# Patient Record
Sex: MALE | Race: WHITE
[De-identification: names, ages, dates, MRNs, and addresses within clinical notes are randomized per-mention and may not be internally consistent; named-entity substitution may affect disease eponyms.]

---

## 2020-12-24 ENCOUNTER — HOSPITAL ENCOUNTER (INPATIENT)
Dept: HOSPITAL 61 - ER | Age: 85
LOS: 5 days | DRG: 177 | End: 2020-12-29
Attending: STUDENT IN AN ORGANIZED HEALTH CARE EDUCATION/TRAINING PROGRAM | Admitting: STUDENT IN AN ORGANIZED HEALTH CARE EDUCATION/TRAINING PROGRAM
Payer: MEDICARE

## 2020-12-24 VITALS — BODY MASS INDEX: 45.1 KG/M2 | HEIGHT: 70 IN | WEIGHT: 315 LBS

## 2020-12-24 VITALS — DIASTOLIC BLOOD PRESSURE: 77 MMHG | SYSTOLIC BLOOD PRESSURE: 173 MMHG

## 2020-12-24 VITALS — SYSTOLIC BLOOD PRESSURE: 178 MMHG | DIASTOLIC BLOOD PRESSURE: 86 MMHG

## 2020-12-24 VITALS — DIASTOLIC BLOOD PRESSURE: 72 MMHG | SYSTOLIC BLOOD PRESSURE: 151 MMHG

## 2020-12-24 VITALS — DIASTOLIC BLOOD PRESSURE: 76 MMHG | SYSTOLIC BLOOD PRESSURE: 204 MMHG

## 2020-12-24 VITALS — SYSTOLIC BLOOD PRESSURE: 166 MMHG | DIASTOLIC BLOOD PRESSURE: 74 MMHG

## 2020-12-24 DIAGNOSIS — J12.89: ICD-10-CM
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
## 2020-12-24 DIAGNOSIS — Z87.891: ICD-10-CM

## 2020-12-24 DIAGNOSIS — E87.1: ICD-10-CM

## 2020-12-24 DIAGNOSIS — U07.1: Primary | ICD-10-CM

## 2020-12-24 DIAGNOSIS — I25.10: ICD-10-CM

## 2020-12-24 DIAGNOSIS — K21.9: ICD-10-CM

## 2020-12-24 DIAGNOSIS — E66.01: ICD-10-CM

## 2020-12-24 DIAGNOSIS — Z93.1: ICD-10-CM

## 2020-12-24 DIAGNOSIS — I50.9: ICD-10-CM

## 2020-12-24 DIAGNOSIS — J96.01: ICD-10-CM

## 2020-12-24 DIAGNOSIS — I71.2: ICD-10-CM

## 2020-12-24 DIAGNOSIS — E87.6: ICD-10-CM

## 2020-12-24 DIAGNOSIS — E78.5: ICD-10-CM

## 2020-12-24 DIAGNOSIS — G70.00: ICD-10-CM
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
## 2020-12-24 DIAGNOSIS — I11.0: ICD-10-CM
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
## 2020-12-24 DIAGNOSIS — H54.61: ICD-10-CM
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
## 2020-12-24 DIAGNOSIS — Z66: ICD-10-CM

## 2020-12-24 DIAGNOSIS — M19.90: ICD-10-CM

## 2020-12-24 DIAGNOSIS — J44.0: ICD-10-CM

## 2020-12-24 DIAGNOSIS — F03.90: ICD-10-CM

## 2020-12-24 DIAGNOSIS — G93.40: ICD-10-CM

## 2020-12-24 LAB
% BANDS: 11 % (ref 0–9)
% LYMPHS: 4 % (ref 24–48)
% MONOS: 4 % (ref 0–10)
% SEGS: 81 % (ref 35–66)
ALBUMIN SERPL-MCNC: 3.4 G/DL (ref 3.4–5)
ALBUMIN/GLOB SERPL: 0.8 {RATIO} (ref 1–1.7)
ALP SERPL-CCNC: 110 U/L (ref 46–116)
ALT SERPL-CCNC: 26 U/L (ref 16–63)
ANION GAP SERPL CALC-SCNC: 12 MMOL/L (ref 6–14)
APTT BLD: 39 SEC (ref 24–38)
AST SERPL-CCNC: 29 U/L (ref 15–37)
BASE EXCESS ABG: 2 MMOL/L (ref -3–3)
BASOPHILS # BLD AUTO: 0 X10^3/UL (ref 0–0.2)
BASOPHILS NFR BLD: 0 % (ref 0–3)
BILIRUB SERPL-MCNC: 0.5 MG/DL (ref 0.2–1)
BUN SERPL-MCNC: 15 MG/DL (ref 8–26)
BUN/CREAT SERPL: 14 (ref 6–20)
CALCIUM SERPL-MCNC: 8.5 MG/DL (ref 8.5–10.1)
CHLORIDE SERPL-SCNC: 91 MMOL/L (ref 98–107)
CK SERPL-CCNC: 266 U/L (ref 39–308)
CO2 SERPL-SCNC: 28 MMOL/L (ref 21–32)
CREAT SERPL-MCNC: 1.1 MG/DL (ref 0.7–1.3)
EOSINOPHIL NFR BLD: 0 % (ref 0–3)
EOSINOPHIL NFR BLD: 0 X10^3/UL (ref 0–0.7)
ERYTHROCYTE [DISTWIDTH] IN BLOOD BY AUTOMATED COUNT: 16.5 % (ref 11.5–14.5)
GFR SERPLBLD BASED ON 1.73 SQ M-ARVRAT: 63.2 ML/MIN
GLUCOSE SERPL-MCNC: 111 MG/DL (ref 70–99)
HCO3 BLDA-SCNC: 25 MMOL/L (ref 21–28)
HCT VFR BLD CALC: 42.4 % (ref 39–53)
HGB BLD-MCNC: 14.5 G/DL (ref 13–17.5)
INFLUENZA A PATIENT: NEGATIVE
INFLUENZA B PATIENT: NEGATIVE
INSPIRATION SETTING TIME VENT: 36
LYMPHOCYTES # BLD: 0.3 X10^3/UL (ref 1–4.8)
LYMPHOCYTES NFR BLD AUTO: 5 % (ref 24–48)
MCH RBC QN AUTO: 30 PG (ref 25–35)
MCHC RBC AUTO-ENTMCNC: 34 G/DL (ref 31–37)
MCV RBC AUTO: 89 FL (ref 79–100)
MONO #: 0.7 X10^3/UL (ref 0–1.1)
MONOCYTES NFR BLD: 11 % (ref 0–9)
NEUT #: 5.5 X10^3/UL (ref 1.8–7.7)
NEUTROPHILS NFR BLD AUTO: 84 % (ref 31–73)
PCO2 BLDA: 37 MMHG (ref 35–46)
PLATELET # BLD AUTO: 167 X10^3/UL (ref 140–400)
PLATELET # BLD EST: ADEQUATE 10*3/UL
PO2 BLDA: 103 MMHG (ref 65–108)
POTASSIUM SERPL-SCNC: 3.4 MMOL/L (ref 3.5–5.1)
PROT SERPL-MCNC: 7.5 G/DL (ref 6.4–8.2)
PROTHROMBIN TIME: 14.2 SEC (ref 11.7–14)
RBC # BLD AUTO: 4.78 X10^6/UL (ref 4.3–5.7)
SAO2 % BLDA: 98 % (ref 92–99)
SODIUM SERPL-SCNC: 131 MMOL/L (ref 136–145)
WBC # BLD AUTO: 6.6 X10^3/UL (ref 4–11)

## 2020-12-24 PROCEDURE — 87040 BLOOD CULTURE FOR BACTERIA: CPT

## 2020-12-24 PROCEDURE — 71275 CT ANGIOGRAPHY CHEST: CPT

## 2020-12-24 PROCEDURE — 82550 ASSAY OF CK (CPK): CPT

## 2020-12-24 PROCEDURE — 85007 BL SMEAR W/DIFF WBC COUNT: CPT

## 2020-12-24 PROCEDURE — G0378 HOSPITAL OBSERVATION PER HR: HCPCS

## 2020-12-24 PROCEDURE — 84484 ASSAY OF TROPONIN QUANT: CPT

## 2020-12-24 PROCEDURE — 80053 COMPREHEN METABOLIC PANEL: CPT

## 2020-12-24 PROCEDURE — 70450 CT HEAD/BRAIN W/O DYE: CPT

## 2020-12-24 PROCEDURE — 83735 ASSAY OF MAGNESIUM: CPT

## 2020-12-24 PROCEDURE — 71045 X-RAY EXAM CHEST 1 VIEW: CPT

## 2020-12-24 PROCEDURE — 80048 BASIC METABOLIC PNL TOTAL CA: CPT

## 2020-12-24 PROCEDURE — 90471 IMMUNIZATION ADMIN: CPT

## 2020-12-24 PROCEDURE — 85610 PROTHROMBIN TIME: CPT

## 2020-12-24 PROCEDURE — 96361 HYDRATE IV INFUSION ADD-ON: CPT

## 2020-12-24 PROCEDURE — 96375 TX/PRO/DX INJ NEW DRUG ADDON: CPT

## 2020-12-24 PROCEDURE — 96365 THER/PROPH/DIAG IV INF INIT: CPT

## 2020-12-24 PROCEDURE — 82805 BLOOD GASES W/O2 SATURATION: CPT

## 2020-12-24 PROCEDURE — 36415 COLL VENOUS BLD VENIPUNCTURE: CPT

## 2020-12-24 PROCEDURE — 87804 INFLUENZA ASSAY W/OPTIC: CPT

## 2020-12-24 PROCEDURE — 84100 ASSAY OF PHOSPHORUS: CPT

## 2020-12-24 PROCEDURE — 83880 ASSAY OF NATRIURETIC PEPTIDE: CPT

## 2020-12-24 PROCEDURE — 85730 THROMBOPLASTIN TIME PARTIAL: CPT

## 2020-12-24 PROCEDURE — 90715 TDAP VACCINE 7 YRS/> IM: CPT

## 2020-12-24 PROCEDURE — 85025 COMPLETE CBC W/AUTO DIFF WBC: CPT

## 2020-12-24 PROCEDURE — 85379 FIBRIN DEGRADATION QUANT: CPT

## 2020-12-24 PROCEDURE — 83605 ASSAY OF LACTIC ACID: CPT

## 2020-12-24 PROCEDURE — 93005 ELECTROCARDIOGRAM TRACING: CPT

## 2020-12-24 PROCEDURE — 36600 WITHDRAWAL OF ARTERIAL BLOOD: CPT

## 2020-12-24 PROCEDURE — U0003 INFECTIOUS AGENT DETECTION BY NUCLEIC ACID (DNA OR RNA); SEVERE ACUTE RESPIRATORY SYNDROME CORONAVIRUS 2 (SARS-COV-2) (CORONAVIRUS DISEASE [COVID-19]), AMPLIFIED PROBE TECHNIQUE, MAKING USE OF HIGH THROUGHPUT TECHNOLOGIES AS DESCRIBED BY CMS-2020-01-R: HCPCS

## 2020-12-24 PROCEDURE — 82962 GLUCOSE BLOOD TEST: CPT

## 2020-12-24 RX ADMIN — ZINC SULFATE CAP 220 MG (50 MG ELEMENTAL ZN) SCH MG: 220 (50 ZN) CAP at 10:31

## 2020-12-24 RX ADMIN — CHOLECALCIFEROL CAP 125 MCG (5000 UNIT) SCH UNIT: 125 CAP at 10:31

## 2020-12-24 RX ADMIN — SIMVASTATIN SCH MG: 20 TABLET, FILM COATED ORAL at 22:32

## 2020-12-24 RX ADMIN — CARVEDILOL SCH MG: 6.25 TABLET, FILM COATED ORAL at 17:52

## 2020-12-24 RX ADMIN — OXYCODONE HYDROCHLORIDE AND ACETAMINOPHEN SCH MG: 500 TABLET ORAL at 10:31

## 2020-12-24 RX ADMIN — ENOXAPARIN SODIUM SCH MG: 40 INJECTION SUBCUTANEOUS at 06:30

## 2020-12-24 RX ADMIN — OXYCODONE HYDROCHLORIDE AND ACETAMINOPHEN SCH MG: 500 TABLET ORAL at 22:32

## 2020-12-24 NOTE — RAD
CT head without contrast dated 12/24/2020.



No comparison available.



CLINICAL INDICATION: Altered mental status.



TECHNIQUE:



Continues axial imaging the head was performed from skull base to vertex. No contrast administered. 

One or more of the following individualized dose reduction techniques were utilized for this examinat
ion:  

1. Automated exposure control  

2. Adjustment of the mA and/or kV according to patient size  

3. Use of iterative reconstruction technique.



FINDINGS:



Ventricles and sulci are moderately prominent for age. No midline shift or mass effect. Moderate patc
hy low density in the deep/subcortical periventricular white matter. No hemorrhage or extra-axial col
lection. Posterior fossa and brainstem unremarkable.



Mild mucosal thickening of the bilateral ethmoid air cells. The visualized paranasal sinuses and mast
oid air cells are otherwise clear. No apparent calvarial abnormality.



IMPRESSION:

1. No evidence of acute intracranial hemorrhage or mass.

2. Moderate chronic small vessel ischemic changes and atrophy.

3. Mild sinus disease.



Electronically signed by: Arnol Frederick MD (12/24/2020 1:29 AM) [facility]

## 2020-12-24 NOTE — PHYS DOC
General Adult


EDM:


Chief Complaint:  ALTERED MENTAL STATUS





HPI:


HPI:


88-year-old male past medical history significant for LE swelling (on lasix 40mg

tid), COPD (former tobacco use), HTN, HLD, gerd, glaucoma, carotid stenosis (no 

cea), R eye blindness (s/p CRAO), OA, and myasthenia gravis (on azathioprine, dx

2016),  presents to the ED brought in by EMS from home, concern for altered 

mental status.  Patient lives with his son and daughter-in-law.  Son tested 

positive for Covid 11 days ago.  Daughter-in-law is having Covid symptoms and 

was tested today, now awaiting results.  Patient was saturating 90% on room air,

required 3 L nasal cannula.  Patient is alert to self, month, location but not 

year (states "[age]"). Pt with no active complaints in ed. Hx limited due

to mental status and hearing difficulties. EMS reports they were out at 

patient's house earlier in the day for an accidental fall in the bathroom, 

unsure if pt hit his head (found back propped up next to tub), w/superficial 

skin tears to left arm. Pt is a DNR code status.





I spoke with pts' daughter, Izabella Travis on phone who provided most of the above 

information. Pts' last known well was yesterday. Pt was saying "I need a 38 and 

a 13," while making the sign of a cross. No known h/o enlarged aorta. Daughter 

reports no h/o intubations, not aware of any enlarged aorta. Last 

hospitalization was 2016 at [facility] when he was diagnosed with MG-why feeding tube 

was placed. Pt has chronic exertional dyspnea.





Review of Systems:


Review of Systems:


Constitutional:   Denies fever or chills, lack of taste/smell


Eyes:   Denies change in visual acuity or red eye


HENT:   Denies nasal congestion or sore throat. [] 


Respiratory:   Denies cough or shortness of breath. [] 


Cardiovascular:   Denies chest pain or edema. [] 


GI:   Deniesnausea, vomiting, or diarrhea. [] 


:  Denies dysuria or hematuria


Musculoskeletal:   Denies back pain or joint pain. [] 


Integument:   Denies rash or crepitus


Neurologic:   Denies headache, neck stiffness, focal weakness or sensory 

changes. [] 


Endocrine:   Denies polyuria or polydipsia. [] 


Psychiatric:  Denies depression or anxiety. []





Heart Score:


Risk Factors:


Risk Factors:  DM, Current or recent (<one month) smoker, HTN, HLP, family 

history of CAD, obesity.


Risk Scores:


Score 0 - 3:  2.5% MACE over next 6 weeks - Discharge Home


Score 4 - 6:  20.3% MACE over next 6 weeks - Admit for Clinical Observation


Score 7 - 10:  72.7% MACE over next 6 weeks - Early Invasive Strategies





Current Medications:





Current Medications








 Medications


  (Trade)  Dose


 Ordered  Sig/Judie  Start Time


 Stop Time Status Last Admin


Dose Admin


 


 Sodium Chloride  1,000 ml @ 


 1,000 mls/hr  1X  ONCE  20 00:15


 20 01:14   














Allergies:


Allergies:





Allergies








Coded Allergies Type Severity Reaction Last Updated Verified


 


  Unable to Assess    20 No











Physical Exam:


PE:





Constitutional: no acute distress, non-toxic appearance, very hard of hearing, 

afebrile


HENT: Normocephalic, atraumatic,


Eyes: EOMI, conjunctiva normal, no discharge.  


Neck: Normal range of motion,  supple, 


Cardiovascular: S1/2 present, regular rhythm


Lungs & Thorax: Speaking in full sentences, bilateral equal chest rise, no 

tachypnea or increased work of breathing, 96% on 3LNC


Abdomen:  soft, no tenderness, obese, G-tube in place


Skin: Warm, dry, bl LE edema with venous stasis


Back: No tenderness, no CVA tenderness. [] 


Extremities: no cyanosis, skin tears over left arm


Neurologic: GCS14, alert to name, month, place not year, normal motor function, 

normal sensory function, no focal deficits noted. []


Psychologic: Affect normal, judgement normal, mood normal





EKG:


EKG:


Sinus rhythm at 97 bpm, left axis deviation, , , first-degree AV 

block, no T wave inversions, no ST elevations or ST depressions, inferior Q 

waves





Radiology/Procedures:


Radiology/Procedures:


                                 IMAGING REPORT





                                     Signed





PATIENT: GRAYBILL,CLAUDE WACCOUNT: [id number]     MRN#: [id number]


: 1932           LOCATION: ER              AGE: 88


SEX: M                    EXAM DT: 20         ACCESSION#: [id number]


STATUS: PRE ER            ORD. PHYSICIAN: BLANK GARCIA DO


REASON: soa


PROCEDURE: PORTABLE CHEST 1V





Single view chest dated 2020.





No comparison available.





CLINICAL INDICATION: Shortness of breath.





FINDINGS:





Single upright portable exam performed. Heart size is moderately enlarged. 

Tortuosity of the thoracic aorta. Lungs are hypoinflated and there is patchy and

 linear opacity at both lung bases. No pleural effusion. No pneumothorax.





IMPRESSION:


1. Patchy bibasilar opacity, atelectasis versus early pneumonia.


2. Cardiomegaly with tortuosity and ectasia of the thoracic aorta.





Electronically signed by: Arnol Frederick MD (2020 12:40 AM) [facility]














DICTATED and SIGNED BY:     ARNOL FREDERICK MD


DATE:     20 [id number] 0


                                        


                                 IMAGING REPORT





                                     Signed





PATIENT: GRAYBILL,CLAUDE WACCOUNT: [id number]     MRN#: [id number]


: 1932           LOCATION: ER              AGE: 88


SEX: M                    EXAM DT: 20         ACCESSION#: [id number]


STATUS: REG ER            ORD. PHYSICIAN: BLANK GARCIA DO


REASON: ams


PROCEDURE: CT HEAD WO CONTRAST





CT head without contrast dated 2020.





No comparison available.





CLINICAL INDICATION: Altered mental status.





TECHNIQUE:





Continues axial imaging the head was performed from skull base to vertex. No 

contrast administered. 


One or more of the following individualized dose reduction techniques were 

utilized for this examination:  


1. Automated exposure control  


2. Adjustment of the mA and/or kV according to patient size  


3. Use of iterative reconstruction technique.





FINDINGS:





Ventricles and sulci are moderately prominent for age. No midline shift or mass 

effect. Moderate patchy low density in the deep/subcortical periventricular 

white matter. No hemorrhage or extra-axial collection. Posterior fossa and 

brainstem unremarkable.





Mild mucosal thickening of the bilateral ethmoid air cells. The visualized 

paranasal sinuses and mastoid air cells are otherwise clear. No apparent 

calvarial abnormality.





IMPRESSION:


1. No evidence of acute intracranial hemorrhage or mass.


2. Moderate chronic small vessel ischemic changes and atrophy.


3. Mild sinus disease.





Electronically signed by: Arnol Frederick MD (2020 1:29 AM) [facility]














DICTATED and SIGNED BY:     ARNOL FREDERICK MD


DATE:     20 [id number] 0


                                 IMAGING REPORT





                                     Signed





PATIENT: GRAYBILL,CLAUDE WACCOUNT: [id number]     MRN#: [id number]


: 1932           LOCATION: ER              AGE: 88


SEX: M                    EXAM DT: 20         ACCESSION#: [id number]


STATUS: REG ER            ORD. PHYSICIAN: BLANK GARCIA DO


REASON: soa, r/o pe


PROCEDURE: CT ANGIOGRAPHY CHEST





CTA chest with contrast dated 2020.





No comparison available.





CLINICAL INDICATION: Shortness of breath. Possible pulmonary embolus.





TECHNIQUE:





Contiguous axial imaging the chest performed following the intravenous 

administration of intravenous Isovue-370. Study was performed as dedicated PE 

protocol with thin cut coronal MIPS 3-D reconstruction. 


One or more of the following individualized dose reduction techniques were 

utilized for this examination:  


1. Automated exposure control  


2. Adjustment of the mA and/or kV according to patient size  


3. Use of iterative reconstruction technique





FINDINGS:





Contrast bolus is adequate. No evidence of central, lobar or segmental pulmonary

 embolus. Subsegmental branches are not well evaluated based on technique.





Heart size is mildly enlarged. No pericardial effusion. Coronary artery 

calcifications. 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 

aorta measuring 4 cm transverse. No mediastinal, hilar or axillary 

lymphadenopathy. Thyroid gland unremarkable.





Central airways are patent. There are irregular linear peripheral opacities 

throughout both lungs, basilar predominant. Intermixed patchy groundglass 

opacity bilaterally. No consolidation. No significant pleural effusion. No 

pneumothorax.





Images of the upper abdomen are unremarkable. There are low-density foci at the 

upper pole of each kidney that are indeterminate but probably represent cysts. 

Percutaneous gastric tube in place. No acute bony abnormality. Multilevel 

spondylosis.





IMPRESSION:


1. No evidence of central, lobar or segmental pulmonary embolus.


2. Prominent interstitial markings with intermixed groundglass opacity, 

nonspecific. This could be related to low-grade edema or interstitial fibrosis. 

Atypical infection considered less likely.


3. Coronary artery calcifications and mild aneurysmal dilation of the ascending 

thoracic aorta.





Electronically signed by: Arnol Frederick MD (2020 2:40 AM) [facility] [place]














DICTATED and SIGNED BY:     ARNOL FREDERICK MD


DATE:     20 [id number] 0





Course & Med Decision Making:


Course & Med Decision Making


Pertinent Labs and Imaging studies reviewed. (See chart for details)





Concern for hypoxia in a male with multiple comorbidities, likely covid, test 

pending. Requiring NC.  Influenza negative.  Patient afebrile with no 

leukocytosis.  Chest x-ray concerning for pneumonia.  CTA showing ectatic 

thoracic aorta with no pulmonary emboli.  Will admit for further medical 

management. Daughter agrees with this plan and aware he likely has covid, 

guarded condition.





I have spoken with the patient and/or caregivers.  I have explained the p

atient's condition, diagnosis and treatment plan based on the information 

available to me at this time.  I have answered the patient's and/or caregivers 

questions and answered any concerns.  The patient and/or caregivers have as good

 an understanding of the patient's diagnosis, condition and treatment plan as 

can be expected at this point.  The patient has been stabilized within the 

capability of the emergency department.  The patient will be transported for 

further care and management or will be moved to an observation or inpatient 

service.  I have communicated with the staff or medical practitioner taking over

 this patient's care.





Dragon Disclaimer:


Dragon Disclaimer:


This electronic medical record was generated, in whole or in part, using a voice

 recognition dictation system.





Departure


Departure


Impression:  


   Primary Impression:  


   AMS (altered mental status)


   Additional Impressions:  


   Person under investigation for COVID-19


   Acute respiratory failure with hypoxia


   CAP (community acquired pneumonia)


Disposition:  09 ADMITTED AS INPT THIS HOSP


Admitting Physician:  ALY (Dr. Eaton)


Condition:  GUARDED


Referrals:  


NO PCP (PCP)











BLANK GARCIA DO               Dec 24, 2020 01:02

## 2020-12-24 NOTE — PDOC1
History and Physical


Date of Service:


DOS:


DATE: 12/24/20 


TIME: 09:00





Chief Complaint:


Chief Complain:


altered mental status





History of Present Illness:


HPI:


88-year-old male past medical history significant for LE swelling on lasix 40mg 

tid, COPD (former tobacco use), HTN, HLD, gerd, glaucoma, R eye blindness (s/p 

CRAO), OA, and myasthenia gravis (on azathioprine, dx 2016),  presents to the ED

brought in by EMS from home, concern for altered mental status.  Patient lives 

with his son and daughter-in-law.  Son tested positive for Covid 11 days ago.  

Daughter-in-law is having Covid symptoms and was tested today, now awaiting 

results.  Patient was saturating 90% on room air, required 3 L nasal cannula.  

Patient is alert to self, month, location but not year. Pt with no active 

complaints in ed. Hx limited due to mental status and hearing difficulties. EMS 

reports they were out at patient's house earlier in the day for an accidental 

fall in the bathroom, unsure if pt hit his head (found back propped up next to 

tub), w/superficial skin tears to left arm. Pt is a DNR code status.





Patient's daughter, Izabella Travis, provided most of the above information. Pts' 

last known well was yesterday. Pt was saying "I need a 38 and a 13," while 

making the sign of a cross. No known h/o enlarged aorta. Daughter reports no h/o

intubations, not aware of any enlarged aorta. [place] 

[facility] [place] [facility] [place]-that's why feeding tube was placed. Pt has 

chronic exertional dyspnea.





Past Medical/Surgical History:


PMH/PSH:


PMHx :myasthenia gravis on azathioprine treatment COPD, hypertension, 

dyslipidemia, GERD, glaucoma, osteoarthritis


Past surgical history: PEG tube placement





Allergies:


Allergies:  


Coded Allergies:  


     No Known Drug Allergies (Unverified , 12/24/20)





Family History:


Family History:


Reviewed with no relevant findings





Social History:


Social History:


Former smoker





Current Medications:


Current Medications





Current Medications


Sodium Chloride 1,000 ml @  1,000 mls/hr 1X  ONCE IV  Last administered on 

12/24/20at 00:55;  Start 12/24/20 at 00:15;  Stop 12/24/20 at 01:14;  Status DC


Sodium Chloride 1,000 ml @  1,000 mls/hr 1X  ONCE IV  Last administered on 

12/24/20at 00:56;  Start 12/24/20 at 00:15;  Stop 12/24/20 at 01:14;  Status DC


Ceftriaxone Sodium (Rocephin) 1 gm 1X  ONCE IVP  Last administered on 12/24/20at

02:05;  Start 12/24/20 at 01:15;  Stop 12/24/20 at 01:16;  Status DC


Azithromycin 250 ml @  250 mls/hr 1X  ONCE IV  Last administered on 12/24/20at 

02:05;  Start 12/24/20 at 01:15;  Stop 12/24/20 at 02:14;  Status DC


Dexamethasone Sodium Phosphate (Decadron) 10 mg 1X  ONCE IV  Last administered 

on 12/24/20at 02:05;  Start 12/24/20 at 01:15;  Stop 12/24/20 at 01:16;  Status 

DC


Iohexol (Omnipaque 350 Mg/ml) 100 ml 1X  ONCE IV  Last administered on 

12/24/20at 02:31;  Start 12/24/20 at 02:15;  Stop 12/24/20 at 02:16;  Status DC


Info (CONTRAST GIVEN -- Rx MONITORING) 1 each PRN DAILY  PRN MC SEE COMMENTS;  

Start 12/24/20 at 02:15;  Stop 12/26/20 at 02:14


Diphtheria/ Tetanus/Acell Pertussis (ADACEL TDap SYRINGE) 0.5 ml ONCE ONCE VAX 

IM  Last administered on 12/24/20at 05:12;  Start 12/24/20 at 03:00;  Stop 

12/24/20 at 03:01;  Status DC


Ondansetron HCl (Zofran) 4 mg PRN Q8HRS  PRN IV NAUSEA/VOMITING;  Start 12/24/20

at 03:15;  Stop 12/25/20 at 03:14


Acetaminophen (Tylenol) 650 mg PRN Q4HRS  PRN PO FEVER > 100.3'F;  Start 

12/24/20 at 03:15;  Stop 12/24/20 at 06:36;  Status DC


Doxycycline Hyclate 100 mg/ Dextrose 100 ml @  50 mls/hr Q12HR IV ;  Start 

12/25/20 at 09:00


Dexamethasone Sodium Phosphate (Decadron) 6 mg DAILY IVP ;  Start 12/25/20 at 

09:00


Ascorbic Acid (Vitamin C) 500 mg BID PO ;  Start 12/24/20 at 09:00;  Stop 1/7/21

at 08:59


Zinc Sulfate (Orazinc) 220 mg DAILY PO ;  Start 12/24/20 at 09:00;  Stop 1/7/21 

at 08:59


Vitamin D (Vitamin D3) 5,000 unit DAILY PO ;  Start 12/24/20 at 09:00;  Stop 

1/7/21 at 08:59


Ondansetron HCl (Zofran) 4 mg PRN Q6HRS  PRN IVP NAUSEA/VOMITING;  Start 

12/24/20 at 06:15


Al Hydroxide/Mg Hydroxide (Mylanta Plus Xs) 30 ml PRN Q3HRS  PRN PO HEARTBURN / 

GAS;  Start 12/24/20 at 06:15


Calcium Carbonate/ Glycine (Tums) 500 mg PRN Q3HRS  PRN PO UPSET STOMACH;  Start

12/24/20 at 06:15


Zolpidem Tartrate (Ambien) 5 mg PRN QHS  PRN PO INSOMNIA, MAY REPEAT IN 1HR;  

Start 12/24/20 at 06:15


Morphine Sulfate (Morphine Sulfate) 2 mg PRN Q1HR  PRN IV PAIN;  Start 12/24/20 

at 06:15


Acetaminophen (Tylenol) 650 mg PRN Q6HRS  PRN PO Headaches, Temp > 101.5F;  

Start 12/24/20 at 06:15


Magnesium Hydroxide (Milk Of Magnesia) 2,400 mg PRN Q12HR  PRN PO CONSTIPATION; 

Start 12/24/20 at 06:15


Bisacodyl (Dulcolax Supp) 10 mg PRN DAILY  PRN CO CONSTIPATION;  Start 12/24/20 

at 06:15


Enoxaparin Sodium (Lovenox 40mg Syringe) 40 mg Q24H SQ ;  Start 12/24/20 at 

06:30


Tramadol HCl (Ultram) 50 mg PRN Q6HRS  PRN PO PAIN;  Start 12/24/20 at 06:15





ROS:


Review of Systems


Review of System


REVIEW OF SYSTEMS:


GENERAL:  Denies weakness


SKIN:  No bruising, hair changes or rashes.


EYES:  No blurred, double or loss of vision.


NOSE AND THROAT:  No history of nosebleeds, hoarseness or sore throat.


HEART:  No history of palpitations, chest pain or shortness of breath on


exertion.


LUNGS:  Denies cough, hemoptysis, wheezing or shortness of breath.


GASTROINTESTINAL:  Denies changes in appetite, nausea, vomiting, diarrhea or


constipation.


GENITOURINARY:  No history of frequency, urgency, hesitancy or nocturia.


NEUROLOGIC:  Denies history of numbness, tingling, or tremor.


PSYCHIATRIC:  No history of panic, anxiety or depression.


ENDOCRINE:  No history of heat or cold intolerance, polyuria or polydipsia.


EXTREMITIES:  Denies joint pain, pain on walking or stiffness.





Physical Exam:


Vital Signs:





Vital Signs








  Date Time  Temp Pulse Resp B/P (MAP) Pulse Ox O2 Delivery O2 Flow Rate FiO2


 


12/24/20 06:36  88 18  94   


 


12/24/20 04:01 99.7       





 99.7       


 


12/24/20 00:15    155/84 (107)  Nasal Cannula 3.0 








Physcial Exam:


GEN:   No apparent distress.  Alert and oriented


HEENT:   Normal cephalic, atraumatic, external auditory canals are patent


EYES:   Extraocular muscles are intact, pupil are equally round and reactive to 

light and accommodation


MUSCULOSKELETAL:  Well developed , well nourished, good range of motion


ENDOCRINE:   No thyromegaly was palpated


LYMPHATICS:   No cervical chain or axillary nodes were noted


HEMATOPOIETIC:  No bruising


NECK:   Supple, no JVD, no thyromegaly was noted


LUNGS:   Clear to auscultation in all lung fields without rhonchi or wheezing


HEART:    RRR, S!, S2 present.  Peripheral pulses intact, no obvious murmurs 

noted


ABDOMEN:   Soft, nontender.  Positive bowel sounds, no organomegaly, normal 

bowel sounds


EXTREMITIES:   Without clubbing, cyanosis, or edema.  Pedal pulses intact.  

Negative Homans sign


NEUROLOGIC:   Normal speech and tone.  A&O x 3, moves all extremities, no 

obvious focal deficits


PSYCHIATRIC:   Normal affect, normal mood. Stable


SKIN:   No ulcerations or rashes, good skin turgor, no jaundice


VASCULAR:   Good capillary refill, neurovascular bundle appears to be intact





Labs:


Labs:





Laboratory Tests








Test


 12/24/20


00:37 12/24/20


00:52 12/24/20


02:00


 


Influenza Type A Antigen


 Negative


(NEGATIVE) 


 





 


Influenza Type B Antigen


 Negative


(NEGATIVE) 


 





 


White Blood Count


 


 6.6 x10^3/uL


(4.0-11.0) 





 


Red Blood Count


 


 4.78 x10^6/uL


(4.30-5.70) 





 


Hemoglobin


 


 14.5 g/dL


(13.0-17.5) 





 


Hematocrit


 


 42.4 %


(39.0-53.0) 





 


Mean Corpuscular Volume  89 fL ()  


 


Mean Corpuscular Hemoglobin  30 pg (25-35)  


 


Mean Corpuscular Hemoglobin


Concent 


 34 g/dL


(31-37) 





 


Red Cell Distribution Width


 


 16.5 %


(11.5-14.5) 





 


Platelet Count


 


 167 x10^3/uL


(140-400) 





 


Neutrophils (%) (Auto)  84 % (31-73)  


 


Lymphocytes (%) (Auto)  5 % (24-48)  


 


Monocytes (%) (Auto)  11 % (0-9)  


 


Eosinophils (%) (Auto)  0 % (0-3)  


 


Basophils (%) (Auto)  0 % (0-3)  


 


Neutrophils # (Auto)


 


 5.5 x10^3/uL


(1.8-7.7) 





 


Lymphocytes # (Auto)


 


 0.3 x10^3/uL


(1.0-4.8) 





 


Monocytes # (Auto)


 


 0.7 x10^3/uL


(0.0-1.1) 





 


Eosinophils # (Auto)


 


 0.0 x10^3/uL


(0.0-0.7) 





 


Basophils # (Auto)


 


 0.0 x10^3/uL


(0.0-0.2) 





 


Segmented Neutrophils %  81 % (35-66)  


 


Band Neutrophils %  11 % (0-9)  


 


Lymphocytes %  4 % (24-48)  


 


Monocytes %  4 % (0-10)  


 


Platelet Estimate


 


 Adequate


(ADEQUATE) 





 


Prothrombin Time


 


 14.2 SEC


(11.7-14.0) 





 


Prothromb Time International


Ratio 


 1.1 (0.8-1.1) 


 





 


Activated Partial


Thromboplast Time 


 39 SEC (24-38) 


 





 


D-Dimer (Ban)


 


 2.73 ug/mlFEU


(0.00-0.50) 





 


Sodium Level


 


 131 mmol/L


(136-145) 





 


Potassium Level


 


 3.4 mmol/L


(3.5-5.1) 





 


Chloride Level


 


 91 mmol/L


() 





 


Carbon Dioxide Level


 


 28 mmol/L


(21-32) 





 


Anion Gap  12 (6-14)  


 


Blood Urea Nitrogen


 


 15 mg/dL


(8-26) 





 


Creatinine


 


 1.1 mg/dL


(0.7-1.3) 





 


Estimated GFR


(Cockcroft-Gault) 


 63.2 


 





 


BUN/Creatinine Ratio  14 (6-20)  


 


Glucose Level


 


 111 mg/dL


(70-99) 





 


Lactic Acid Level


 


 1.2 mmol/L


(0.4-2.0) 





 


Calcium Level


 


 8.5 mg/dL


(8.5-10.1) 





 


Total Bilirubin


 


 0.5 mg/dL


(0.2-1.0) 





 


Aspartate Amino Transf


(AST/SGOT) 


 29 U/L (15-37) 


 





 


Alanine Aminotransferase


(ALT/SGPT) 


 26 U/L (16-63) 


 





 


Alkaline Phosphatase


 


 110 U/L


() 





 


Creatine Kinase


 


 266 U/L


() 





 


Troponin I Quantitative


 


 < 0.017 ng/mL


(0.000-0.055) 





 


NT-Pro-B-Type Natriuretic


Peptide 


 362 pg/mL


(0-449) 





 


Total Protein


 


 7.5 g/dL


(6.4-8.2) 





 


Albumin


 


 3.4 g/dL


(3.4-5.0) 





 


Albumin/Globulin Ratio  0.8 (1.0-1.7)  


 


O2 Saturation   98 % (92-99) 


 


Arterial Blood pH


 


 


 7.45


(7.35-7.45)


 


Arterial Blood pCO2 at


Patient Temp 


 


 37 mmHg


(35-46)


 


Arterial Blood pO2 at Patient


Temp 


 


 103 mmHg


()


 


Arterial Blood HCO3


 


 


 25 mmol/L


(21-28)


 


Arterial Blood Base Excess


 


 


 2 mmol/L


(-3-3)


 


FiO2   36 








Laboratory Tests








Test


 12/24/20


00:37 12/24/20


00:52 12/24/20


02:00


 


Influenza Type A Antigen


 Negative


(NEGATIVE) 


 





 


Influenza Type B Antigen


 Negative


(NEGATIVE) 


 





 


White Blood Count


 


 6.6 x10^3/uL


(4.0-11.0) 





 


Red Blood Count


 


 4.78 x10^6/uL


(4.30-5.70) 





 


Hemoglobin


 


 14.5 g/dL


(13.0-17.5) 





 


Hematocrit


 


 42.4 %


(39.0-53.0) 





 


Mean Corpuscular Volume  89 fL ()  


 


Mean Corpuscular Hemoglobin  30 pg (25-35)  


 


Mean Corpuscular Hemoglobin


Concent 


 34 g/dL


(31-37) 





 


Red Cell Distribution Width


 


 16.5 %


(11.5-14.5) 





 


Platelet Count


 


 167 x10^3/uL


(140-400) 





 


Neutrophils (%) (Auto)  84 % (31-73)  


 


Lymphocytes (%) (Auto)  5 % (24-48)  


 


Monocytes (%) (Auto)  11 % (0-9)  


 


Eosinophils (%) (Auto)  0 % (0-3)  


 


Basophils (%) (Auto)  0 % (0-3)  


 


Neutrophils # (Auto)


 


 5.5 x10^3/uL


(1.8-7.7) 





 


Lymphocytes # (Auto)


 


 0.3 x10^3/uL


(1.0-4.8) 





 


Monocytes # (Auto)


 


 0.7 x10^3/uL


(0.0-1.1) 





 


Eosinophils # (Auto)


 


 0.0 x10^3/uL


(0.0-0.7) 





 


Basophils # (Auto)


 


 0.0 x10^3/uL


(0.0-0.2) 





 


Segmented Neutrophils %  81 % (35-66)  


 


Band Neutrophils %  11 % (0-9)  


 


Lymphocytes %  4 % (24-48)  


 


Monocytes %  4 % (0-10)  


 


Platelet Estimate


 


 Adequate


(ADEQUATE) 





 


Prothrombin Time


 


 14.2 SEC


(11.7-14.0) 





 


Prothromb Time International


Ratio 


 1.1 (0.8-1.1) 


 





 


Activated Partial


Thromboplast Time 


 39 SEC (24-38) 


 





 


D-Dimer (Ban)


 


 2.73 ug/mlFEU


(0.00-0.50) 





 


Sodium Level


 


 131 mmol/L


(136-145) 





 


Potassium Level


 


 3.4 mmol/L


(3.5-5.1) 





 


Chloride Level


 


 91 mmol/L


() 





 


Carbon Dioxide Level


 


 28 mmol/L


(21-32) 





 


Anion Gap  12 (6-14)  


 


Blood Urea Nitrogen


 


 15 mg/dL


(8-26) 





 


Creatinine


 


 1.1 mg/dL


(0.7-1.3) 





 


Estimated GFR


(Cockcroft-Gault) 


 63.2 


 





 


BUN/Creatinine Ratio  14 (6-20)  


 


Glucose Level


 


 111 mg/dL


(70-99) 





 


Lactic Acid Level


 


 1.2 mmol/L


(0.4-2.0) 





 


Calcium Level


 


 8.5 mg/dL


(8.5-10.1) 





 


Total Bilirubin


 


 0.5 mg/dL


(0.2-1.0) 





 


Aspartate Amino Transf


(AST/SGOT) 


 29 U/L (15-37) 


 





 


Alanine Aminotransferase


(ALT/SGPT) 


 26 U/L (16-63) 


 





 


Alkaline Phosphatase


 


 110 U/L


() 





 


Creatine Kinase


 


 266 U/L


() 





 


Troponin I Quantitative


 


 < 0.017 ng/mL


(0.000-0.055) 





 


NT-Pro-B-Type Natriuretic


Peptide 


 362 pg/mL


(0-449) 





 


Total Protein


 


 7.5 g/dL


(6.4-8.2) 





 


Albumin


 


 3.4 g/dL


(3.4-5.0) 





 


Albumin/Globulin Ratio  0.8 (1.0-1.7)  


 


O2 Saturation   98 % (92-99) 


 


Arterial Blood pH


 


 


 7.45


(7.35-7.45)


 


Arterial Blood pCO2 at


Patient Temp 


 


 37 mmHg


(35-46)


 


Arterial Blood pO2 at Patient


Temp 


 


 103 mmHg


()


 


Arterial Blood HCO3


 


 


 25 mmol/L


(21-28)


 


Arterial Blood Base Excess


 


 


 2 mmol/L


(-3-3)


 


FiO2   36 











Images:


Images


CXR





IMPRESSION:


1. Patchy bibasilar opacity, atelectasis versus early pneumonia.


2. Cardiomegaly with tortuosity and ectasia of the thoracic aorta.








HEAD CT





IMPRESSION:


1. No evidence of acute intracranial hemorrhage or mass.


2. Moderate chronic small vessel ischemic changes and atrophy.


3. Mild sinus disease.





CHEST CTA





IMPRESSION:


1. No evidence of central, lobar or segmental pulmonary embolus.


2. Prominent interstitial markings with intermixed groundglass opacity, 

nonspecific. This could be related to low-grade edema or interstitial fibrosis. 

Atypical infection considered less likely.


3. Coronary artery calcifications and mild aneurysmal dilation of the ascending 

thoracic aorta.





Assessment/Plan


Assessment/Plan


Acute  encephalopathy NOS


PUI for COVID


Acute hypoxic respiratory distress


Hyponatremia, Hypokalemia


Myasthenia Gravis








No obvious centrally acting medications currently.  No obvious signs of 

infection on physical exam.  No fevers or nuchal rigidity.  CT head is negative 

for acute etiology.  No history or signs of trauma


Consider dementia prevention protocol


Provide adequate lighting (open curtains during the day, turn the lights off at 

night)


Provide frequent personal contact with family, friends, and staff or TV


Encourage early and frequent mobilization


Rehab screening ordered


IV Haldol as needed for agitation, consider sitter as needed if non-redirectable

agitation


Avoid physical restraints, catheters or tubes, and benzodiazepines


Nutrition consult if there is malnutrition or concern for vitamin deficiencies


Continue IV fluids


Pulmonology consult for Covid PNA


Lovenox for DVT prophylaxis


[] GI prophylaxis


ADA diet


Full code


Discussed with RN and SW


Dispo





Justifications for Admission


Other Justification


Respiratory failure with hypoxia, COVID-19 PNA











KELLIE CANDELARIA MD                    Dec 24, 2020 09:05

## 2020-12-24 NOTE — RAD
CTA chest with contrast dated 12/24/2020.



No comparison available.



CLINICAL INDICATION: Shortness of breath. Possible pulmonary embolus.



TECHNIQUE:



Contiguous axial imaging the chest performed following the intravenous administration of intravenous 
Isovue-370. Study was performed as dedicated PE protocol with thin cut coronal MIPS 3-D reconstructio
n. 

One or more of the following individualized dose reduction techniques were utilized for this examinat
ion:  

1. Automated exposure control  

2. Adjustment of the mA and/or kV according to patient size  

3. Use of iterative reconstruction technique



FINDINGS:



Contrast bolus is adequate. No evidence of central, lobar or segmental pulmonary embolus. Subsegmenta
l branches are not well evaluated based on technique.



Heart size is mildly enlarged. No pericardial effusion. Coronary artery calcifications. There is mild
 aneurysmal dilation of the ascending thoracic aorta measuring 4 cm transverse. No mediastinal, hilar
 or axillary lymphadenopathy. Thyroid gland unremarkable.



Central airways are patent. There are irregular linear peripheral opacities throughout both lungs, ba
silar predominant. Intermixed patchy groundglass opacity bilaterally. No consolidation. No significan
t pleural effusion. No pneumothorax.



Images of the upper abdomen are unremarkable. There are low-density foci at the upper pole of each ki
dney that are indeterminate but probably represent cysts. Percutaneous gastric tube in place. No acut
e bony abnormality. Multilevel spondylosis.



IMPRESSION:

1. No evidence of central, lobar or segmental pulmonary embolus.

2. Prominent interstitial markings with intermixed groundglass opacity, nonspecific. This could be re
lated to low-grade edema or interstitial fibrosis. Atypical infection considered less likely.

3. Coronary artery calcifications and mild aneurysmal dilation of the ascending thoracic aorta.



Electronically signed by: Arnol Frederick MD (12/24/2020 2:40 AM) [facility]

## 2020-12-24 NOTE — RAD
Single view chest dated 12/24/2020.



No comparison available.



CLINICAL INDICATION: Shortness of breath.



FINDINGS:



Single upright portable exam performed. Heart size is moderately enlarged. Tortuosity of the thoracic
 aorta. Lungs are hypoinflated and there is patchy and linear opacity at both lung bases. No pleural 
effusion. No pneumothorax.



IMPRESSION:

1. Patchy bibasilar opacity, atelectasis versus early pneumonia.

2. Cardiomegaly with tortuosity and ectasia of the thoracic aorta.



Electronically signed by: Arnol Frederick MD (12/24/2020 12:40 AM) [facility]

## 2020-12-24 NOTE — CONS
DATE OF CONSULTATION:  



PULMONARY CONSULTATION



ATTENDING PHYSICIAN:  Dr. Adarsh Eaton.



REASON FOR CONSULTATION:  Dyspnea, respiratory failure.



HISTORY OF PRESENT ILLNESS:  The patient is an 88-year-old male with a BMI of

47.  He denies any tobacco history recently, but a former tobacco user.  He was

brought into the hospital with complaint of some altered mental status as well

as dyspnea and hypoxia.  The patient has history of myasthenia gravis for which

he is on azathioprine.  He lives with his son and daughter-in-law.  Son tested

positive for COVID 11 days ago.  Daughter-in-law was also having COVID symptoms

and was tested today, results are not available.  The patient is being currently

on 3 liters nasal cannula.  He does not appear to be in any obvious respiratory

distress.  He does have a cough.  No chest pain, no nausea, vomiting, no

diarrhea, no dysuria.  No focal weakness.



CT of the chest was reviewed by me.  This was done for pulmonary embolism

protocol.  There was no evidence of any major pulmonary emboli.  There is

evidence of prominent interstitial markings with some mixed ground glass

opacities.  There is no old x-ray available for comparison.  I have been asked

to see him for further evaluation.  COVID test is pending.



PAST MEDICAL HISTORY:  Significant for myasthenia gravis, on azathioprine,

questionable COPD, history of obesity, GERD, glaucoma, osteoarthritis.



PAST SURGICAL HISTORY:  History of PEG tube placement.



ALLERGIES:  None.



FAMILY HISTORY:  Noncontributory to lungs.



ALLERGIES:  None.



MEDICATIONS:  Reviewed as listed in the MRAD including dexamethasone,

doxycycline.  Lovenox for DVT prophylaxis.



PHYSICAL EXAMINATION:  On examination, he is in no obvious respiratory distress.

 T-max of 99.7.  Blood pressure 178/86, pulse ox 99%.  Visual exam done due to

COVID-19.  He is obese and has trace pitting edema.



LABORATORY DATA:  Reviewed.  Influenza screen is negative.  BUN 15, creatinine

1.1.  D-dimer 2.7.  ABGs with a pO2 of 103 on 36% FiO2.



IMPRESSION:

1.  Acute hypoxic respiratory failure secondary to multifactorial etiologies and

likely COVID-19 pneumonia.  He lives with his son and daughter-in-law and they

are positive.  Other differential diagnosis would include the possibility of

congestive heart failure versus underlying interstitial lung disease related to 
Methotrexate.

2.  Abnormal CT chest with bilateral interstitial infiltrates and some ground

glass infiltrates.  This is nonspecific, but in the setting of COVID-19

infection, this could be all related to COVID.  Will benefit from a followup

chest x-ray and CT chest post treatment.

3.  No evidence of pulmonary embolism.

4.  Questionable chronic obstructive pulmonary disease.

5.  Morbid obesity, suspected obstructive sleep apnea and obesity

hypoventilation syndrome.



RECOMMENDATIONS:

1.  Continue with present oxygen.  Keep saturation 92 and above.

2.  Dexamethasone.

3.  Doxycycline.

4.  Lovenox for DVT prophylaxis.

5.  Follow up chest x-ray as needed clinically.

6.  May benefit from a followup CT chest in 6-8 weeks.

7.  The patient is currently on methotrexate.  Possibility of

methotrexate-induced interstitial lung disease cannot be ruled out.

8.  Rule out COVID-19

 



______________________________

GIOVANNI ADEN MD



DR:  KENNY/tomasz  JOB#:  [id number] / [id number]

DD:  12/24/2020 11:16  DT:  12/24/2020 11:42

OLEG

## 2020-12-25 VITALS — DIASTOLIC BLOOD PRESSURE: 65 MMHG | SYSTOLIC BLOOD PRESSURE: 149 MMHG

## 2020-12-25 VITALS — DIASTOLIC BLOOD PRESSURE: 86 MMHG | SYSTOLIC BLOOD PRESSURE: 152 MMHG

## 2020-12-25 VITALS — DIASTOLIC BLOOD PRESSURE: 81 MMHG | SYSTOLIC BLOOD PRESSURE: 174 MMHG

## 2020-12-25 VITALS — SYSTOLIC BLOOD PRESSURE: 163 MMHG | DIASTOLIC BLOOD PRESSURE: 81 MMHG

## 2020-12-25 VITALS — SYSTOLIC BLOOD PRESSURE: 145 MMHG | DIASTOLIC BLOOD PRESSURE: 61 MMHG

## 2020-12-25 VITALS — DIASTOLIC BLOOD PRESSURE: 81 MMHG | SYSTOLIC BLOOD PRESSURE: 170 MMHG

## 2020-12-25 LAB
ANION GAP SERPL CALC-SCNC: 8 MMOL/L (ref 6–14)
BASOPHILS # BLD AUTO: 0 X10^3/UL (ref 0–0.2)
BASOPHILS NFR BLD: 0 % (ref 0–3)
BUN SERPL-MCNC: 14 MG/DL (ref 8–26)
CALCIUM SERPL-MCNC: 8.4 MG/DL (ref 8.5–10.1)
CHLORIDE SERPL-SCNC: 100 MMOL/L (ref 98–107)
CO2 SERPL-SCNC: 29 MMOL/L (ref 21–32)
CREAT SERPL-MCNC: 0.9 MG/DL (ref 0.7–1.3)
EOSINOPHIL NFR BLD: 0 % (ref 0–3)
EOSINOPHIL NFR BLD: 0 X10^3/UL (ref 0–0.7)
ERYTHROCYTE [DISTWIDTH] IN BLOOD BY AUTOMATED COUNT: 16 % (ref 11.5–14.5)
GFR SERPLBLD BASED ON 1.73 SQ M-ARVRAT: 79.6 ML/MIN
GLUCOSE SERPL-MCNC: 115 MG/DL (ref 70–99)
HCT VFR BLD CALC: 37 % (ref 39–53)
HGB BLD-MCNC: 12.7 G/DL (ref 13–17.5)
LYMPHOCYTES # BLD: 0.4 X10^3/UL (ref 1–4.8)
LYMPHOCYTES NFR BLD AUTO: 5 % (ref 24–48)
MAGNESIUM SERPL-MCNC: 2.5 MG/DL (ref 1.8–2.4)
MCH RBC QN AUTO: 30 PG (ref 25–35)
MCHC RBC AUTO-ENTMCNC: 34 G/DL (ref 31–37)
MCV RBC AUTO: 88 FL (ref 79–100)
MONO #: 0.7 X10^3/UL (ref 0–1.1)
MONOCYTES NFR BLD: 9 % (ref 0–9)
NEUT #: 6.6 X10^3/UL (ref 1.8–7.7)
NEUTROPHILS NFR BLD AUTO: 85 % (ref 31–73)
PHOSPHATE SERPL-MCNC: 2.8 MG/DL (ref 2.6–4.7)
PLATELET # BLD AUTO: 150 X10^3/UL (ref 140–400)
POTASSIUM SERPL-SCNC: 3.3 MMOL/L (ref 3.5–5.1)
RBC # BLD AUTO: 4.22 X10^6/UL (ref 4.3–5.7)
SODIUM SERPL-SCNC: 137 MMOL/L (ref 136–145)
WBC # BLD AUTO: 7.7 X10^3/UL (ref 4–11)

## 2020-12-25 RX ADMIN — CARVEDILOL SCH MG: 6.25 TABLET, FILM COATED ORAL at 17:46

## 2020-12-25 RX ADMIN — ENOXAPARIN SODIUM SCH MG: 40 INJECTION SUBCUTANEOUS at 20:42

## 2020-12-25 RX ADMIN — TIMOLOL MALEATE SCH DROP: 5 SOLUTION/ DROPS OPHTHALMIC at 10:54

## 2020-12-25 RX ADMIN — OXYCODONE HYDROCHLORIDE AND ACETAMINOPHEN SCH MG: 500 TABLET ORAL at 10:20

## 2020-12-25 RX ADMIN — SIMVASTATIN SCH MG: 20 TABLET, FILM COATED ORAL at 20:25

## 2020-12-25 RX ADMIN — DEXAMETHASONE SODIUM PHOSPHATE SCH MG: 4 INJECTION, SOLUTION INTRAMUSCULAR; INTRAVENOUS at 10:21

## 2020-12-25 RX ADMIN — DOXYCYCLINE SCH MLS/HR: 100 INJECTION, POWDER, LYOPHILIZED, FOR SOLUTION INTRAVENOUS at 20:24

## 2020-12-25 RX ADMIN — ENOXAPARIN SODIUM SCH MG: 40 INJECTION SUBCUTANEOUS at 07:12

## 2020-12-25 RX ADMIN — OXYCODONE HYDROCHLORIDE AND ACETAMINOPHEN SCH MG: 500 TABLET ORAL at 20:25

## 2020-12-25 RX ADMIN — ZINC SULFATE CAP 220 MG (50 MG ELEMENTAL ZN) SCH MG: 220 (50 ZN) CAP at 10:19

## 2020-12-25 RX ADMIN — DEXAMETHASONE SODIUM PHOSPHATE SCH MG: 4 INJECTION, SOLUTION INTRAMUSCULAR; INTRAVENOUS at 09:00

## 2020-12-25 RX ADMIN — DOXYCYCLINE SCH MLS/HR: 100 INJECTION, POWDER, LYOPHILIZED, FOR SOLUTION INTRAVENOUS at 09:00

## 2020-12-25 RX ADMIN — CARVEDILOL SCH MG: 6.25 TABLET, FILM COATED ORAL at 10:22

## 2020-12-25 RX ADMIN — DOXYCYCLINE SCH MLS/HR: 100 INJECTION, POWDER, LYOPHILIZED, FOR SOLUTION INTRAVENOUS at 10:24

## 2020-12-25 RX ADMIN — CHOLECALCIFEROL CAP 125 MCG (5000 UNIT) SCH UNIT: 125 CAP at 10:19

## 2020-12-25 RX ADMIN — ENOXAPARIN SODIUM SCH MG: 40 INJECTION SUBCUTANEOUS at 20:25

## 2020-12-25 RX ADMIN — ASPIRIN 81 MG SCH MG: 81 TABLET ORAL at 10:19

## 2020-12-25 NOTE — NUR
The patient is awake, alert x2, has episodes of confusion. He was verbally aggressive to the 
staff. He stated that he already said ten times to get rid of his water cup and nobody 
followed. He also said to this nurse that "you can only do one thing at a time?!" when he 
wanted extra blankets and cups of ice. This nurse explained to him that I'll give the 
medicines first then do the rest.

## 2020-12-25 NOTE — PDOC
[facility] [facility] PROGRESS NOTE


Date of Service


DOS:


DATE: 12/25/20 


TIME: 12:47





Chief Complaint


Chief Complaint


Acute  encephalopathy NOS


PUI for COVID


Acute hypoxic respiratory distress


Hyponatremia, Hypokalemia


Myasthenia Gravis








No obvious centrally acting medications currently.  No obvious signs of infec

tion on physical exam.  No fevers or nuchal rigidity.  CT head is negative for 

acute etiology.  No history or signs of trauma


Consider dementia prevention protocol


Provide adequate lighting (open curtains during the day, turn the lights off at 

night)


Provide frequent personal contact with family, friends, and staff or TV


Encourage early and frequent mobilization


Rehab screening ordered


IV Haldol as needed for agitation, consider sitter as needed if non-redirectable

agitation


Avoid physical restraints, catheters or tubes, and benzodiazepines


Nutrition consult if there is malnutrition or concern for vitamin deficiencies


Continue IV fluids


Pulmonology consult for Covid PNA


Lovenox for DVT prophylaxis


ADA diet


Full code


Discussed with RN and SW


Dispo inpatient management as above.  Pending discussion with family regarding 

palliative care versus aggressive management of comorbidities.





History of Present Illness


History of Present Illness


12/25/2020


No acute events overnight.  Patient saturating 95% on 4 L nasal cannula.  Fever 

of T-max of 100.6 F.  Currently on doxycycline.  Pending Covid test.  Patient 

is currently refusing medications as of now because he thinks he does not need 

any of them.  He says his home medication regimen has been fine.  Will address 

CODE STATUS with family.  Patient's chart, labs, images were reviewed and 

discussed with RN





88-year-old male past medical history significant for LE swelling on lasix 40mg 

tid, COPD (former tobacco use), HTN, HLD, gerd, glaucoma, R eye blindness (s/p 

CRAO), OA, and myasthenia gravis (on azathioprine, dx 2016),  presents to the ED

brought in by EMS from home, concern for altered mental status.  Patient lives 

with his son and daughter-in-law.  Son tested positive for Covid 11 days ago.  

Daughter-in-law is having Covid symptoms and was tested today, now awaiting 

results.  Patient was saturating 90% on room air, required 3 L nasal cannula.  

Patient is alert to self, month, location but not year. Pt with no active 

complaints in ed. Hx limited due to mental status and hearing difficulties. EMS 

reports they were out at patient's house earlier in the day for an accidental 

fall in the bathroom, unsure if pt hit his head (found back propped up next to 

tub), w/superficial skin tears to left arm. Pt is a DNR code status.





Vitals/I&O


Vitals/I&O:





                                   Vital Signs








  Date Time  Temp Pulse Resp B/P (MAP) Pulse Ox O2 Delivery O2 Flow Rate FiO2


 


12/25/20 10:44 99.5 91 18 145/61 (89)    





 99.5       


 


12/25/20 07:00     95   


 


12/24/20 20:00      Nasal Cannula 4.0 














                                    I & O   


 


 12/24/20 12/24/20 12/25/20





 15:00 23:00 07:00


 


Intake Total 140 ml 200 ml 240 ml


 


Output Total  400 ml 


 


Balance 140 ml -200 ml 240 ml











Physical Exam


Lungs:  Clear





Labs


Labs:





Laboratory Tests








Test


 12/25/20


04:30


 


White Blood Count


 7.7 x10^3/uL


(4.0-11.0)


 


Red Blood Count


 4.22 x10^6/uL


(4.30-5.70)


 


Hemoglobin


 12.7 g/dL


(13.0-17.5)


 


Hematocrit


 37.0 %


(39.0-53.0)


 


Mean Corpuscular Volume 88 fL () 


 


Mean Corpuscular Hemoglobin 30 pg (25-35) 


 


Mean Corpuscular Hemoglobin


Concent 34 g/dL


(31-37)


 


Red Cell Distribution Width


 16.0 %


(11.5-14.5)


 


Platelet Count


 150 x10^3/uL


(140-400)


 


Neutrophils (%) (Auto) 85 % (31-73) 


 


Lymphocytes (%) (Auto) 5 % (24-48) 


 


Monocytes (%) (Auto) 9 % (0-9) 


 


Eosinophils (%) (Auto) 0 % (0-3) 


 


Basophils (%) (Auto) 0 % (0-3) 


 


Neutrophils # (Auto)


 6.6 x10^3/uL


(1.8-7.7)


 


Lymphocytes # (Auto)


 0.4 x10^3/uL


(1.0-4.8)


 


Monocytes # (Auto)


 0.7 x10^3/uL


(0.0-1.1)


 


Eosinophils # (Auto)


 0.0 x10^3/uL


(0.0-0.7)


 


Basophils # (Auto)


 0.0 x10^3/uL


(0.0-0.2)


 


Sodium Level


 137 mmol/L


(136-145)


 


Potassium Level


 3.3 mmol/L


(3.5-5.1)


 


Chloride Level


 100 mmol/L


()


 


Carbon Dioxide Level


 29 mmol/L


(21-32)


 


Anion Gap 8 (6-14) 


 


Blood Urea Nitrogen


 14 mg/dL


(8-26)


 


Creatinine


 0.9 mg/dL


(0.7-1.3)


 


Estimated GFR


(Cockcroft-Gault) 79.6 





 


Glucose Level


 115 mg/dL


(70-99)


 


Calcium Level


 8.4 mg/dL


(8.5-10.1)


 


Phosphorus Level


 2.8 mg/dL


(2.6-4.7)


 


Magnesium Level


 2.5 mg/dL


(1.8-2.4)











Assessment and Plan


Assessmemt and Plan


Problems


Medical Problems:


(1) Acute respiratory failure with hypoxia


Status: Acute  





(2) CAP (community acquired pneumonia)


Status: Acute  





(3) Person under investigation for COVID-19


Status: Acute  











Comment


Review of Relevant


I have reviewed the following items belle (where applicable) has been applied.


Medications:





Current Medications








 Medications


  (Trade)  Dose


 Ordered  Sig/Judie


 Route


 PRN Reason  Start Time


 Stop Time Status Last Admin


Dose Admin


 


 Amlodipine


 Besylate


  (Norvasc)  10 mg  DAILY


 PO


   12/25/20 09:00


    12/25/20 10:20





 


 Aspirin


  (Aspirin


 Chewable)  81 mg  DAILY


 PO


   12/25/20 09:00


    12/25/20 10:19





 


 Carvedilol


  (Coreg)  6.25 mg  BIDWMEALS


 PO


   12/24/20 18:30


    12/25/20 10:22





 


 Simvastatin


  (Zocor)  20 mg  QHS


 PO


   12/24/20 21:00


    12/24/20 22:32





 


 Timolol Maleate


  (Timoptic 0.5%


 Ophth)  1 drop  DAILY


 OS


   12/25/20 09:00


    12/25/20 10:54





 


 Azathioprine


  (Imuran)  150 mg  DAILY


 PO


   12/25/20 09:00


    12/25/20 10:20





 


 Enoxaparin Sodium


  (Lovenox 40mg


 Syringe)  40 mg  Q24H


 SQ


   12/25/20 09:00


    12/25/20 10:21





 


 Enoxaparin Sodium


  (Lovenox 40mg


 Syringe)  40 mg  1X  ONCE


 SQ


   12/24/20 17:45


 12/24/20 17:46 DC 12/24/20 17:51














Justifications for Admission


Other Justification


Respiratory failure with hypoxia, COVID-19 PNA











KELLIE CANDELARIA MD                    Dec 25, 2020 12:49

## 2020-12-25 NOTE — PDOC
PULMONARY PROGRESS NOTES


DATE: 12/25/20 


TIME: 08:24


Subjective


no soa


Vitals





Vital Signs








  Date Time  Temp Pulse Resp B/P (MAP) Pulse Ox O2 Delivery O2 Flow Rate FiO2


 


12/25/20 03:00 96.8 80 20 170/81 (110) 97   





 96.8       


 


12/24/20 20:00      Nasal Cannula 4.0 








General:  Alert, No acute distress


Lungs:  Clear


Cardiovascular:  S1


Abdomen:  Soft, Other (obese)


Extremities:  No Edema


Skin:  Warm


Labs





Laboratory Tests








Test


 12/24/20


00:37 12/24/20


00:52 12/24/20


02:00 12/25/20


04:30


 


Influenza Type A Antigen


 Negative


(NEGATIVE) 


 


 





 


Influenza Type B Antigen


 Negative


(NEGATIVE) 


 


 





 


White Blood Count


 


 6.6 x10^3/uL


(4.0-11.0) 


 7.7 x10^3/uL


(4.0-11.0)


 


Red Blood Count


 


 4.78 x10^6/uL


(4.30-5.70) 


 4.22 x10^6/uL


(4.30-5.70)


 


Hemoglobin


 


 14.5 g/dL


(13.0-17.5) 


 12.7 g/dL


(13.0-17.5)


 


Hematocrit


 


 42.4 %


(39.0-53.0) 


 37.0 %


(39.0-53.0)


 


Mean Corpuscular Volume  89 fL ()   88 fL () 


 


Mean Corpuscular Hemoglobin  30 pg (25-35)   30 pg (25-35) 


 


Mean Corpuscular Hemoglobin


Concent 


 34 g/dL


(31-37) 


 34 g/dL


(31-37)


 


Red Cell Distribution Width


 


 16.5 %


(11.5-14.5) 


 16.0 %


(11.5-14.5)


 


Platelet Count


 


 167 x10^3/uL


(140-400) 


 150 x10^3/uL


(140-400)


 


Neutrophils (%) (Auto)  84 % (31-73)   85 % (31-73) 


 


Lymphocytes (%) (Auto)  5 % (24-48)   5 % (24-48) 


 


Monocytes (%) (Auto)  11 % (0-9)   9 % (0-9) 


 


Eosinophils (%) (Auto)  0 % (0-3)   0 % (0-3) 


 


Basophils (%) (Auto)  0 % (0-3)   0 % (0-3) 


 


Neutrophils # (Auto)


 


 5.5 x10^3/uL


(1.8-7.7) 


 6.6 x10^3/uL


(1.8-7.7)


 


Lymphocytes # (Auto)


 


 0.3 x10^3/uL


(1.0-4.8) 


 0.4 x10^3/uL


(1.0-4.8)


 


Monocytes # (Auto)


 


 0.7 x10^3/uL


(0.0-1.1) 


 0.7 x10^3/uL


(0.0-1.1)


 


Eosinophils # (Auto)


 


 0.0 x10^3/uL


(0.0-0.7) 


 0.0 x10^3/uL


(0.0-0.7)


 


Basophils # (Auto)


 


 0.0 x10^3/uL


(0.0-0.2) 


 0.0 x10^3/uL


(0.0-0.2)


 


Segmented Neutrophils %  81 % (35-66)   


 


Band Neutrophils %  11 % (0-9)   


 


Lymphocytes %  4 % (24-48)   


 


Monocytes %  4 % (0-10)   


 


Platelet Estimate


 


 Adequate


(ADEQUATE) 


 





 


Prothrombin Time


 


 14.2 SEC


(11.7-14.0) 


 





 


Prothromb Time International


Ratio 


 1.1 (0.8-1.1) 


 


 





 


Activated Partial


Thromboplast Time 


 39 SEC (24-38) 


 


 





 


D-Dimer (Ban)


 


 2.73 ug/mlFEU


(0.00-0.50) 


 





 


Sodium Level


 


 131 mmol/L


(136-145) 


 137 mmol/L


(136-145)


 


Potassium Level


 


 3.4 mmol/L


(3.5-5.1) 


 3.3 mmol/L


(3.5-5.1)


 


Chloride Level


 


 91 mmol/L


() 


 100 mmol/L


()


 


Carbon Dioxide Level


 


 28 mmol/L


(21-32) 


 29 mmol/L


(21-32)


 


Anion Gap  12 (6-14)   8 (6-14) 


 


Blood Urea Nitrogen


 


 15 mg/dL


(8-26) 


 14 mg/dL


(8-26)


 


Creatinine


 


 1.1 mg/dL


(0.7-1.3) 


 0.9 mg/dL


(0.7-1.3)


 


Estimated GFR


(Cockcroft-Gault) 


 63.2 


 


 79.6 





 


BUN/Creatinine Ratio  14 (6-20)   


 


Glucose Level


 


 111 mg/dL


(70-99) 


 115 mg/dL


(70-99)


 


Lactic Acid Level


 


 1.2 mmol/L


(0.4-2.0) 


 





 


Calcium Level


 


 8.5 mg/dL


(8.5-10.1) 


 8.4 mg/dL


(8.5-10.1)


 


Total Bilirubin


 


 0.5 mg/dL


(0.2-1.0) 


 





 


Aspartate Amino Transf


(AST/SGOT) 


 29 U/L (15-37) 


 


 





 


Alanine Aminotransferase


(ALT/SGPT) 


 26 U/L (16-63) 


 


 





 


Alkaline Phosphatase


 


 110 U/L


() 


 





 


Creatine Kinase


 


 266 U/L


() 


 





 


Troponin I Quantitative


 


 < 0.017 ng/mL


(0.000-0.055) 


 





 


NT-Pro-B-Type Natriuretic


Peptide 


 362 pg/mL


(0-449) 


 





 


Total Protein


 


 7.5 g/dL


(6.4-8.2) 


 





 


Albumin


 


 3.4 g/dL


(3.4-5.0) 


 





 


Albumin/Globulin Ratio  0.8 (1.0-1.7)   


 


O2 Saturation   98 % (92-99)  


 


Arterial Blood pH


 


 


 7.45


(7.35-7.45) 





 


Arterial Blood pCO2 at


Patient Temp 


 


 37 mmHg


(35-46) 





 


Arterial Blood pO2 at Patient


Temp 


 


 103 mmHg


() 





 


Arterial Blood HCO3


 


 


 25 mmol/L


(21-28) 





 


Arterial Blood Base Excess


 


 


 2 mmol/L


(-3-3) 





 


FiO2   36  


 


Phosphorus Level


 


 


 


 2.8 mg/dL


(2.6-4.7)


 


Magnesium Level


 


 


 


 2.5 mg/dL


(1.8-2.4)








Laboratory Tests








Test


 12/25/20


04:30


 


White Blood Count


 7.7 x10^3/uL


(4.0-11.0)


 


Red Blood Count


 4.22 x10^6/uL


(4.30-5.70)


 


Hemoglobin


 12.7 g/dL


(13.0-17.5)


 


Hematocrit


 37.0 %


(39.0-53.0)


 


Mean Corpuscular Volume 88 fL () 


 


Mean Corpuscular Hemoglobin 30 pg (25-35) 


 


Mean Corpuscular Hemoglobin


Concent 34 g/dL


(31-37)


 


Red Cell Distribution Width


 16.0 %


(11.5-14.5)


 


Platelet Count


 150 x10^3/uL


(140-400)


 


Neutrophils (%) (Auto) 85 % (31-73) 


 


Lymphocytes (%) (Auto) 5 % (24-48) 


 


Monocytes (%) (Auto) 9 % (0-9) 


 


Eosinophils (%) (Auto) 0 % (0-3) 


 


Basophils (%) (Auto) 0 % (0-3) 


 


Neutrophils # (Auto)


 6.6 x10^3/uL


(1.8-7.7)


 


Lymphocytes # (Auto)


 0.4 x10^3/uL


(1.0-4.8)


 


Monocytes # (Auto)


 0.7 x10^3/uL


(0.0-1.1)


 


Eosinophils # (Auto)


 0.0 x10^3/uL


(0.0-0.7)


 


Basophils # (Auto)


 0.0 x10^3/uL


(0.0-0.2)


 


Sodium Level


 137 mmol/L


(136-145)


 


Potassium Level


 3.3 mmol/L


(3.5-5.1)


 


Chloride Level


 100 mmol/L


()


 


Carbon Dioxide Level


 29 mmol/L


(21-32)


 


Anion Gap 8 (6-14) 


 


Blood Urea Nitrogen


 14 mg/dL


(8-26)


 


Creatinine


 0.9 mg/dL


(0.7-1.3)


 


Estimated GFR


(Cockcroft-Gault) 79.6 





 


Glucose Level


 115 mg/dL


(70-99)


 


Calcium Level


 8.4 mg/dL


(8.5-10.1)


 


Phosphorus Level


 2.8 mg/dL


(2.6-4.7)


 


Magnesium Level


 2.5 mg/dL


(1.8-2.4)








Medications





Active Scripts








 Medications  Dose


 Route/Sig


 Max Daily Dose Days Date Category


 


 Timoptic 0.5%


  (Timolol Maleate)


 10 Ml Drops  1 Drop


 OS DAILY


  30 12/24/20 Reported


 


 Spironolactone


 100 Mg Tablet  0.5 Tab


 PO DAILY


   12/24/20 Reported


 


 Simvastatin 20 Mg


 Tablet  1 Tab


 PO QHS


   12/24/20 Reported


 


 Xalatan


  (Latanoprost) 2.5


 Ml Drops  1 Drop


 EACHEYE QHS


   12/24/20 Reported


 


 Omega 3 Fish Oil


 Softgel (Omega-3


 Fatty Acids/Fish


 Oil) 1 Each


 Capsule.dr  1 Each


 PO DAILY


   12/24/20 Reported


 


 Multi Vitamin


 Daily


  (Multivitamin) 1


 Each Tablet  1 Tab


 PO DAILY


  30 12/24/20 Reported


 


 Furosemide 40 Mg


 Tablet  3 Tab


 PO DAILY


   12/24/20 Reported


 


 Carvedilol **


  (Carvedilol) 6.25


 Mg Tablet  6.25 Mg


 PO BIDWMEALS


   12/24/20 Reported


 


 Brimonidine


 Tartrate 5 Ml


 Drops  1 Drop


 OS BID


   12/24/20 Reported


 


 Imuran


  (Azathioprine) 50


 Mg Tablet  3 Tab


 PO DAILY


  30 12/24/20 Reported


 


 Aspirin 81 Mg


 Tab.chew  1 Tab


 PO DAILY


   12/24/20 Reported


 


 Amlodipine


 Besylate 10 Mg


 Tablet  10 Mg


 PO DAILY


   12/24/20 Reported











Impression


.


IMPRESSION:


1.  Acute hypoxic respiratory failure secondary to multifactorial etiologies and


likely COVID-19 pneumonia.  He lives with his son and daughter-in-law and they


are positive.  Other differential diagnosis would include the possibility of


congestive heart failure versus underlying interstitial lung disease related to 

Methotrexate.


2.  Abnormal CT chest with bilateral interstitial infiltrates and some ground


glass infiltrates.  This is nonspecific, but in the setting of COVID-19


infection, this could be all related to COVID.  Will benefit from a followup


chest x-ray and CT chest post treatment.


3.  No evidence of pulmonary embolism.


4.  Questionable chronic obstructive pulmonary disease.


5.  Morbid obesity, suspected obstructive sleep apnea and obesity


hypoventilation syndrome.





Plan


.


RECOMMENDATIONS:


1.  Continue with present oxygen.  Keep saturation 92 and above.


2.  Dexamethasone.


3.  Doxycycline.


4.  Lovenox for DVT prophylaxis.


5.  Follow up chest x-ray as needed clinically.


6.  May benefit from a followup CT chest in 6-8 weeks.


7.  The patient is currently on methotrexate.  Possibility of


methotrexate-induced interstitial lung disease cannot be ruled out.


8.  Rule out COVID-19











GIOVANNI ADEN MD                 Dec 25, 2020 08:25 7

## 2020-12-25 NOTE — NUR
The patient was found sitting on the floor by the CNA when she went in the room at 1530. 
This nurse went in the room and saw the patient on sitting position on the floor with his 
left hand supporting his weight. When asked, the patient denied pain or hitting his head. 
He's awake, alert, oriented x 3 and  told this nurse that he tried to get out of his chair. 
Earlier PT worked with him and he was placed on the recliner. Charge RN and 2 other nurses 
came to help to put him back on the bed using the lift. VS /67 HR92 RR 22 O2 sat 97% on 
4 liters and repeat VS upon returning to bed were /70 HR 79 RR 20 O2 sat 95% on 4LPM. 
Dr. Arnett, notified of the incident, order for a shoulder x-ray acknowledged. The patient's 
DPDIAN, Claude Jr updated at [id number].

## 2020-12-25 NOTE — NUR
The patient refused doxycycline and dexamethasone this morning. This nurse explained the 
indications of the medicines but he still refused. He stated that there's nothing wrong with 
him and his lungs are fine. He needs oxygen because he's a little short of breath. Notified 
Dr. Arnett  who spoke to the patient upon his rounds.

## 2020-12-26 VITALS — DIASTOLIC BLOOD PRESSURE: 81 MMHG | SYSTOLIC BLOOD PRESSURE: 165 MMHG

## 2020-12-26 VITALS — SYSTOLIC BLOOD PRESSURE: 192 MMHG | DIASTOLIC BLOOD PRESSURE: 88 MMHG

## 2020-12-26 VITALS — DIASTOLIC BLOOD PRESSURE: 84 MMHG | SYSTOLIC BLOOD PRESSURE: 176 MMHG

## 2020-12-26 VITALS — SYSTOLIC BLOOD PRESSURE: 158 MMHG | DIASTOLIC BLOOD PRESSURE: 70 MMHG

## 2020-12-26 VITALS — DIASTOLIC BLOOD PRESSURE: 72 MMHG | SYSTOLIC BLOOD PRESSURE: 161 MMHG

## 2020-12-26 VITALS — DIASTOLIC BLOOD PRESSURE: 76 MMHG | SYSTOLIC BLOOD PRESSURE: 166 MMHG

## 2020-12-26 LAB
ANION GAP SERPL CALC-SCNC: 9 MMOL/L (ref 6–14)
BASOPHILS # BLD AUTO: 0 X10^3/UL (ref 0–0.2)
BASOPHILS NFR BLD: 0 % (ref 0–3)
BUN SERPL-MCNC: 14 MG/DL (ref 8–26)
CALCIUM SERPL-MCNC: 8.4 MG/DL (ref 8.5–10.1)
CHLORIDE SERPL-SCNC: 98 MMOL/L (ref 98–107)
CO2 SERPL-SCNC: 28 MMOL/L (ref 21–32)
CREAT SERPL-MCNC: 1.1 MG/DL (ref 0.7–1.3)
EOSINOPHIL NFR BLD: 0 % (ref 0–3)
EOSINOPHIL NFR BLD: 0 X10^3/UL (ref 0–0.7)
ERYTHROCYTE [DISTWIDTH] IN BLOOD BY AUTOMATED COUNT: 16.2 % (ref 11.5–14.5)
GFR SERPLBLD BASED ON 1.73 SQ M-ARVRAT: 63.2 ML/MIN
GLUCOSE SERPL-MCNC: 169 MG/DL (ref 70–99)
HCT VFR BLD CALC: 38.8 % (ref 39–53)
HGB BLD-MCNC: 12.7 G/DL (ref 13–17.5)
LYMPHOCYTES # BLD: 0.1 X10^3/UL (ref 1–4.8)
LYMPHOCYTES NFR BLD AUTO: 1 % (ref 24–48)
MAGNESIUM SERPL-MCNC: 2.5 MG/DL (ref 1.8–2.4)
MCH RBC QN AUTO: 30 PG (ref 25–35)
MCHC RBC AUTO-ENTMCNC: 33 G/DL (ref 31–37)
MCV RBC AUTO: 90 FL (ref 79–100)
MONO #: 0.5 X10^3/UL (ref 0–1.1)
MONOCYTES NFR BLD: 4 % (ref 0–9)
NEUT #: 11.6 X10^3/UL (ref 1.8–7.7)
NEUTROPHILS NFR BLD AUTO: 95 % (ref 31–73)
PLATELET # BLD AUTO: 168 X10^3/UL (ref 140–400)
POTASSIUM SERPL-SCNC: 3.6 MMOL/L (ref 3.5–5.1)
RBC # BLD AUTO: 4.32 X10^6/UL (ref 4.3–5.7)
SODIUM SERPL-SCNC: 135 MMOL/L (ref 136–145)
WBC # BLD AUTO: 12.2 X10^3/UL (ref 4–11)

## 2020-12-26 RX ADMIN — CARVEDILOL SCH MG: 6.25 TABLET, FILM COATED ORAL at 09:02

## 2020-12-26 RX ADMIN — DOXYCYCLINE SCH MLS/HR: 100 INJECTION, POWDER, LYOPHILIZED, FOR SOLUTION INTRAVENOUS at 20:26

## 2020-12-26 RX ADMIN — OXYCODONE HYDROCHLORIDE AND ACETAMINOPHEN SCH MG: 500 TABLET ORAL at 08:55

## 2020-12-26 RX ADMIN — DOXYCYCLINE SCH MLS/HR: 100 INJECTION, POWDER, LYOPHILIZED, FOR SOLUTION INTRAVENOUS at 06:55

## 2020-12-26 RX ADMIN — ASPIRIN 81 MG SCH MG: 81 TABLET ORAL at 08:56

## 2020-12-26 RX ADMIN — ENOXAPARIN SODIUM SCH MG: 40 INJECTION SUBCUTANEOUS at 20:26

## 2020-12-26 RX ADMIN — ENOXAPARIN SODIUM SCH MG: 40 INJECTION SUBCUTANEOUS at 08:56

## 2020-12-26 RX ADMIN — OXYCODONE HYDROCHLORIDE AND ACETAMINOPHEN SCH MG: 500 TABLET ORAL at 20:25

## 2020-12-26 RX ADMIN — SIMVASTATIN SCH MG: 20 TABLET, FILM COATED ORAL at 20:25

## 2020-12-26 RX ADMIN — CARVEDILOL SCH MG: 6.25 TABLET, FILM COATED ORAL at 17:26

## 2020-12-26 RX ADMIN — ENOXAPARIN SODIUM SCH MG: 40 INJECTION SUBCUTANEOUS at 09:00

## 2020-12-26 RX ADMIN — CHOLECALCIFEROL CAP 125 MCG (5000 UNIT) SCH UNIT: 125 CAP at 08:55

## 2020-12-26 RX ADMIN — DOXYCYCLINE SCH MLS/HR: 100 INJECTION, POWDER, LYOPHILIZED, FOR SOLUTION INTRAVENOUS at 09:06

## 2020-12-26 RX ADMIN — TIMOLOL MALEATE SCH DROP: 5 SOLUTION/ DROPS OPHTHALMIC at 08:57

## 2020-12-26 RX ADMIN — ENOXAPARIN SODIUM SCH MG: 40 INJECTION SUBCUTANEOUS at 22:24

## 2020-12-26 RX ADMIN — ZINC SULFATE CAP 220 MG (50 MG ELEMENTAL ZN) SCH MG: 220 (50 ZN) CAP at 08:55

## 2020-12-26 RX ADMIN — DEXAMETHASONE SODIUM PHOSPHATE SCH MG: 4 INJECTION, SOLUTION INTRAMUSCULAR; INTRAVENOUS at 08:57

## 2020-12-26 NOTE — PDOC
[facility] PROGRESS NOTE


Date of Service


DOS:


DATE: 12/26/20 


TIME: 11:00





Chief Complaint


Chief Complaint


Acute  encephalopathy NOS


Covid infection


Acute hypoxic respiratory distress


Hyponatremia, Hypokalemia


Myasthenia Gravis








No obvious centrally acting medications currently.  No obvious signs of inf

ection on physical exam.  No fevers or nuchal rigidity.  CT head is negative for

acute etiology.  No history or signs of trauma


Consider dementia prevention protocol


Provide adequate lighting (open curtains during the day, turn the lights off at 

night)


Provide frequent personal contact with family, friends, and staff or TV


Encourage early and frequent mobilization


Rehab screening ordered


IV Haldol as needed for agitation, consider sitter as needed if non-redirectable

agitation


Avoid physical restraints, catheters or tubes, and benzodiazepines


Nutrition consult if there is malnutrition or concern for vitamin deficiencies


Continue IV fluids


Pulmonology consult for Covid PNA


Lovenox for DVT prophylaxis


ADA diet


Full code


Discussed with RN and SW


Dispo inpatient management as above.  Pending discussion with family regarding 

palliative care versus aggressive management of comorbidities.





History of Present Illness


History of Present Illness


12/26/2020


No acute events overnight.  Patient did have a fall during the day in which he 

states that he feels fine after that.  There is no shoulder deformity and no x-r

ay was taken.  Patient has no complaints voiced at this time.  Patient does 

refuse some treatments as he states that he he is not in control and that God 

makes all decisions for him at this time.  I did speak with the patient's son 

about his current management and recent Covid diagnosis.  They agree with 

resuming current care and patient is to remain on DNR/DNI status.  Patient's c

quezada, labs, images were reviewed and discussed with RN





12/25/2020


No acute events overnight.  Patient saturating 95% on 4 L nasal cannula.  Fever 

of T-max of 100.6 F.  Currently on doxycycline.  Pending Covid test.  Patient 

is currently refusing medications as of now because he thinks he does not need 

any of them.  He says his home medication regimen has been fine.  Will address 

CODE STATUS with family.  Patient's chart, labs, images were reviewed and 

discussed with RN





88-year-old male past medical history significant for LE swelling on lasix 40mg 

tid, COPD (former tobacco use), HTN, HLD, gerd, glaucoma, R eye blindness (s/p 

CRAO), OA, and myasthenia gravis (on azathioprine, dx 2016),  presents to the ED

brought in by EMS from home, concern for altered mental status.  Patient lives 

with his son and daughter-in-law.  Son tested positive for Covid 11 days ago.  

Daughter-in-law is having Covid symptoms and was tested today, now awaiting 

results.  Patient was saturating 90% on room air, required 3 L nasal cannula.  

Patient is alert to self, month, location but not year. Pt with no active 

complaints in ed. Hx limited due to mental status and hearing difficulties. EMS 

reports they were out at patient's house earlier in the day for an accidental 

fall in the bathroom, unsure if pt hit his head (found back propped up next to 

tub), w/superficial skin tears to left arm. Pt is a DNR code status.





Vitals/I&O


Vitals/I&O:





                                   Vital Signs








  Date Time  Temp Pulse Resp B/P (MAP) Pulse Ox O2 Delivery O2 Flow Rate FiO2


 


12/26/20 09:02  76  176/84    


 


12/26/20 07:00 98.5  20  95 Nasal Cannula  





 98.5       


 


12/25/20 20:15       4.0 














                                    I & O   


 


 12/25/20 12/25/20 12/26/20





 15:00 23:00 07:00


 


Intake Total 50 ml  100 ml


 


Output Total   500 ml


 


Balance 50 ml  -400 ml











Physical Exam


General:  Alert, No acute distress


Heart:  Regular rate


Lungs:  Clear





Assessment and Plan


Assessmemt and Plan


Problems


Medical Problems:


(1) Acute respiratory failure with hypoxia


Status: Acute  





(2) CAP (community acquired pneumonia)


Status: Acute  





(3) Person under investigation for COVID-19


Status: Acute  











Comment


Review of Relevant


I have reviewed the following items belle (where applicable) has been applied.





Justifications for Admission


Other Justification


Respiratory failure with hypoxia, COVID-19 PNA











KELLIE CANDELARIA MD                    Dec 26, 2020 11:01

## 2020-12-26 NOTE — PDOC
PULMONARY PROGRESS NOTES


DATE: 12/26/20 


TIME: 09:57


Subjective


no soa


Vitals





Vital Signs








  Date Time  Temp Pulse Resp B/P (MAP) Pulse Ox O2 Delivery O2 Flow Rate FiO2


 


12/26/20 09:02  76  176/84    


 


12/26/20 07:00 98.5  20  95 Nasal Cannula  





 98.5       


 


12/25/20 20:15       4.0 








General:  Alert, No acute distress


Lungs:  Clear


Cardiovascular:  S1


Abdomen:  Soft, Other (obese)


Extremities:  No Edema


Skin:  Warm


Labs





Laboratory Tests








Test


 12/25/20


04:30 12/25/20


08:06


 


White Blood Count


 7.7 x10^3/uL


(4.0-11.0) 





 


Red Blood Count


 4.22 x10^6/uL


(4.30-5.70) 





 


Hemoglobin


 12.7 g/dL


(13.0-17.5) 





 


Hematocrit


 37.0 %


(39.0-53.0) 





 


Mean Corpuscular Volume 88 fL ()  


 


Mean Corpuscular Hemoglobin 30 pg (25-35)  


 


Mean Corpuscular Hemoglobin


Concent 34 g/dL


(31-37) 





 


Red Cell Distribution Width


 16.0 %


(11.5-14.5) 





 


Platelet Count


 150 x10^3/uL


(140-400) 





 


Neutrophils (%) (Auto) 85 % (31-73)  


 


Lymphocytes (%) (Auto) 5 % (24-48)  


 


Monocytes (%) (Auto) 9 % (0-9)  


 


Eosinophils (%) (Auto) 0 % (0-3)  


 


Basophils (%) (Auto) 0 % (0-3)  


 


Neutrophils # (Auto)


 6.6 x10^3/uL


(1.8-7.7) 





 


Lymphocytes # (Auto)


 0.4 x10^3/uL


(1.0-4.8) 





 


Monocytes # (Auto)


 0.7 x10^3/uL


(0.0-1.1) 





 


Eosinophils # (Auto)


 0.0 x10^3/uL


(0.0-0.7) 





 


Basophils # (Auto)


 0.0 x10^3/uL


(0.0-0.2) 





 


Sodium Level


 137 mmol/L


(136-145) 





 


Potassium Level


 3.3 mmol/L


(3.5-5.1) 





 


Chloride Level


 100 mmol/L


() 





 


Carbon Dioxide Level


 29 mmol/L


(21-32) 





 


Anion Gap 8 (6-14)  


 


Blood Urea Nitrogen


 14 mg/dL


(8-26) 





 


Creatinine


 0.9 mg/dL


(0.7-1.3) 





 


Estimated GFR


(Cockcroft-Gault) 79.6 


 





 


Glucose Level


 115 mg/dL


(70-99) 





 


Calcium Level


 8.4 mg/dL


(8.5-10.1) 





 


Phosphorus Level


 2.8 mg/dL


(2.6-4.7) 





 


Magnesium Level


 2.5 mg/dL


(1.8-2.4) 





 


Glucose (Fingerstick)


 


 121 mg/dL


(70-99)








Medications





Active Scripts








 Medications  Dose


 Route/Sig


 Max Daily Dose Days Date Category


 


 Timoptic 0.5%


  (Timolol Maleate)


 10 Ml Drops  1 Drop


 OS DAILY


  30 12/24/20 Reported


 


 Spironolactone


 100 Mg Tablet  0.5 Tab


 PO DAILY


   12/24/20 Reported


 


 Simvastatin 20 Mg


 Tablet  1 Tab


 PO QHS


   12/24/20 Reported


 


 Xalatan


  (Latanoprost) 2.5


 Ml Drops  1 Drop


 EACHEYE QHS


   12/24/20 Reported


 


 Omega 3 Fish Oil


 Softgel (Omega-3


 Fatty Acids/Fish


 Oil) 1 Each


 Capsule.dr  1 Each


 PO DAILY


   12/24/20 Reported


 


 Multi Vitamin


 Daily


  (Multivitamin) 1


 Each Tablet  1 Tab


 PO DAILY


  30 12/24/20 Reported


 


 Furosemide 40 Mg


 Tablet  3 Tab


 PO DAILY


   12/24/20 Reported


 


 Carvedilol **


  (Carvedilol) 6.25


 Mg Tablet  6.25 Mg


 PO BIDWMEALS


   12/24/20 Reported


 


 Brimonidine


 Tartrate 5 Ml


 Drops  1 Drop


 OS BID


   12/24/20 Reported


 


 Imuran


  (Azathioprine) 50


 Mg Tablet  3 Tab


 PO DAILY


  30 12/24/20 Reported


 


 Aspirin 81 Mg


 Tab.chew  1 Tab


 PO DAILY


   12/24/20 Reported


 


 Amlodipine


 Besylate 10 Mg


 Tablet  10 Mg


 PO DAILY


   12/24/20 Reported











Impression


.


IMPRESSION:


1.  Acute hypoxic respiratory failure secondary to multifactorial etiologies and


likely COVID-19 pneumonia.  He lives with his son and daughter-in-law and they


are positive.  Other differential diagnosis would include the possibility of


congestive heart failure versus underlying interstitial lung disease related to 

Methotrexate.


2.  Abnormal CT chest with bilateral interstitial infiltrates and some ground


glass infiltrates.  This is nonspecific, but in the setting of COVID-19


infection, this could be all related to COVID.  Will benefit from a followup


chest x-ray and CT chest post treatment.


3.  No evidence of pulmonary embolism.


4.  Questionable chronic obstructive pulmonary disease.


5.  Morbid obesity, suspected obstructive sleep apnea and obesity


hypoventilation syndrome.





Plan


.


RECOMMENDATIONS:


1.  Continue with present oxygen.  Keep saturation 92 and above.


2.  Dexamethasone.


3.  Doxycycline.


4.  Lovenox for DVT prophylaxis.


5.  Follow up chest x-ray as needed clinically.


6.  May benefit from a followup CT chest in 6-8 weeks.


7.  The patient is currently on methotrexate.  Possibility of


methotrexate-induced interstitial lung disease cannot be ruled out.


8.  repeat cxr Monday











GIOVANNI ADEN MD                 Dec 26, 2020 09:58

## 2020-12-27 VITALS — SYSTOLIC BLOOD PRESSURE: 172 MMHG | DIASTOLIC BLOOD PRESSURE: 77 MMHG

## 2020-12-27 VITALS — SYSTOLIC BLOOD PRESSURE: 157 MMHG | DIASTOLIC BLOOD PRESSURE: 72 MMHG

## 2020-12-27 VITALS — DIASTOLIC BLOOD PRESSURE: 93 MMHG | SYSTOLIC BLOOD PRESSURE: 199 MMHG

## 2020-12-27 VITALS — DIASTOLIC BLOOD PRESSURE: 81 MMHG | SYSTOLIC BLOOD PRESSURE: 155 MMHG

## 2020-12-27 VITALS — DIASTOLIC BLOOD PRESSURE: 78 MMHG | SYSTOLIC BLOOD PRESSURE: 179 MMHG

## 2020-12-27 VITALS — DIASTOLIC BLOOD PRESSURE: 70 MMHG | SYSTOLIC BLOOD PRESSURE: 160 MMHG

## 2020-12-27 PROCEDURE — XW033E5 INTRODUCTION OF REMDESIVIR ANTI-INFECTIVE INTO PERIPHERAL VEIN, PERCUTANEOUS APPROACH, NEW TECHNOLOGY GROUP 5: ICD-10-PCS | Performed by: INTERNAL MEDICINE

## 2020-12-27 RX ADMIN — OXYCODONE HYDROCHLORIDE AND ACETAMINOPHEN SCH MG: 500 TABLET ORAL at 20:26

## 2020-12-27 RX ADMIN — OXYCODONE HYDROCHLORIDE AND ACETAMINOPHEN SCH MG: 500 TABLET ORAL at 08:13

## 2020-12-27 RX ADMIN — SIMVASTATIN SCH MG: 20 TABLET, FILM COATED ORAL at 20:25

## 2020-12-27 RX ADMIN — ENOXAPARIN SODIUM SCH MG: 40 INJECTION SUBCUTANEOUS at 08:46

## 2020-12-27 RX ADMIN — CARVEDILOL SCH MG: 6.25 TABLET, FILM COATED ORAL at 08:13

## 2020-12-27 RX ADMIN — BENZONATATE PRN MG: 100 CAPSULE, LIQUID FILLED ORAL at 17:50

## 2020-12-27 RX ADMIN — DEXAMETHASONE SODIUM PHOSPHATE SCH MG: 4 INJECTION, SOLUTION INTRAMUSCULAR; INTRAVENOUS at 08:15

## 2020-12-27 RX ADMIN — DOXYCYCLINE SCH MLS/HR: 100 INJECTION, POWDER, LYOPHILIZED, FOR SOLUTION INTRAVENOUS at 20:25

## 2020-12-27 RX ADMIN — DOXYCYCLINE SCH MLS/HR: 100 INJECTION, POWDER, LYOPHILIZED, FOR SOLUTION INTRAVENOUS at 08:18

## 2020-12-27 RX ADMIN — CHOLECALCIFEROL CAP 125 MCG (5000 UNIT) SCH UNIT: 125 CAP at 08:13

## 2020-12-27 RX ADMIN — TIMOLOL MALEATE SCH DROP: 5 SOLUTION/ DROPS OPHTHALMIC at 08:46

## 2020-12-27 RX ADMIN — ASPIRIN 81 MG SCH MG: 81 TABLET ORAL at 08:13

## 2020-12-27 RX ADMIN — CARVEDILOL SCH MG: 6.25 TABLET, FILM COATED ORAL at 16:13

## 2020-12-27 RX ADMIN — ENOXAPARIN SODIUM SCH MG: 40 INJECTION SUBCUTANEOUS at 20:26

## 2020-12-27 RX ADMIN — ZINC SULFATE CAP 220 MG (50 MG ELEMENTAL ZN) SCH MG: 220 (50 ZN) CAP at 08:13

## 2020-12-27 NOTE — NUR
Pt's sats in mid-80's on 15L NC. Placed on 15L 100% non-rebreather; spO2 up to 92%, RR 34. 
Pt currently resting comfortably in chair. Will continue to monitor.

## 2020-12-27 NOTE — PDOC
[facility] PROGRESS NOTE


Date of Service


DOS:


DATE: 12/27/20 


TIME: 12:19





Chief Complaint


Chief Complaint


Acute  encephalopathy NOS


Covid infection


Acute hypoxic respiratory distress


Hyponatremia, Hypokalemia


Myasthenia Gravis


Moderate dementia








No obvious centrally acting medications currently.  No obvious signs of 

infection on physical exam.  No fevers or nuchal rigidity.  CT head is negative 

for acute etiology.  No history or signs of trauma


Consider dementia prevention protocol


Provide adequate lighting (open curtains during the day, turn the lights off at 

night)


Provide frequent personal contact with family, friends, and staff or TV


Encourage early and frequent mobilization


Rehab screening ordered


IV Haldol as needed for agitation, consider sitter as needed if non-redirectable

agitation


Avoid physical restraints, catheters or tubes, and benzodiazepines


Nutrition consult if there is malnutrition or concern for vitamin deficiencies


Continue IV fluids


Pulmonology consult for Covid PNA


Lovenox for DVT prophylaxis


ADA diet


Full code


Discussed with RN and ARIA


Dispo inpatient management as above.  Pending discussion with family regarding 

palliative care versus aggressive management of comorbidities.





History of Present Illness


History of Present Illness


12/27/2020


No major events overnight.  Patient saturating 93% on 4 L nasal cannula.  No 

complaints voiced at this time.  He is still refusing Lovenox injections.  

Patient's chart, labs, images were reviewed and discussed with RN.  IV 

remdesivir initiated today





12/26/2020


No acute events overnight.  Patient did have a fall during the day in which he 

states that he feels fine after that.  There is no shoulder deformity and no x-

ray was taken.  Patient has no complaints voiced at this time.  Patient does 

refuse some treatments as he states that he he is not in control and that God 

makes all decisions for him at this time.  I did speak with the patient's son 

about his current management and recent Covid diagnosis.  They agree with 

resuming current care and patient is to remain on DNR/DNI status.  Patient's 

chart, labs, images were reviewed and discussed with RN





12/25/2020


No acute events overnight.  Patient saturating 95% on 4 L nasal cannula.  Fever 

of T-max of 100.6 F.  Currently on doxycycline.  Pending Covid test.  Patient 

is currently refusing medications as of now because he thinks he does not need 

any of them.  He says his home medication regimen has been fine.  Will address 

CODE STATUS with family.  Patient's chart, labs, images were reviewed and 

discussed with RN





88-year-old male past medical history significant for LE swelling on lasix 40mg 

tid, COPD (former tobacco use), HTN, HLD, gerd, glaucoma, R eye blindness (s/p 

CRAO), OA, and myasthenia gravis (on azathioprine, dx 2016),  presents to the ED

brought in by EMS from home, concern for altered mental status.  Patient lives 

with his son and daughter-in-law.  Son tested positive for Covid 11 days ago.  

Daughter-in-law is having Covid symptoms and was tested today, now awaiting 

results.  Patient was saturating 90% on room air, required 3 L nasal cannula.  

Patient is alert to self, month, location but not year. Pt with no active 

complaints in ed. Hx limited due to mental status and hearing difficulties. EMS 

reports they were out at patient's house earlier in the day for an accidental 

fall in the bathroom, unsure if pt hit his head (found back propped up next to 

tub), w/superficial skin tears to left arm. Pt is a DNR code status.





Vitals/I&O


Vitals/I&O:





                                   Vital Signs








  Date Time  Temp Pulse Resp B/P (MAP) Pulse Ox O2 Delivery O2 Flow Rate FiO2


 


12/27/20 08:13  74  179/78    


 


12/27/20 07:00 94.1  24  89 Nasal Cannula 4.0 





 94.1       














                                    I & O   


 


 12/26/20 12/26/20 12/27/20





 15:00 23:00 07:00


 


Intake Total 500 ml 360 ml 


 


Output Total  550 ml 0 ml


 


Balance 500 ml -190 ml 0 ml











Physical Exam


General:  Alert, No acute distress


Heart:  Regular rate


Lungs:  Clear





Assessment and Plan


Assessmemt and Plan


Problems


Medical Problems:


(1) Acute respiratory failure with hypoxia


Status: Acute  





(2) CAP (community acquired pneumonia)


Status: Acute  





(3) Person under investigation for COVID-19


Status: Acute  











Comment


Review of Relevant


I have reviewed the following items belle (where applicable) has been applied.





Justifications for Admission


Other Justification


Respiratory failure with hypoxia, COVID-19 PNA











KELLIE CANDELARIA MD                    Dec 27, 2020 12:20

## 2020-12-28 VITALS — DIASTOLIC BLOOD PRESSURE: 69 MMHG | SYSTOLIC BLOOD PRESSURE: 158 MMHG

## 2020-12-28 VITALS — SYSTOLIC BLOOD PRESSURE: 152 MMHG | DIASTOLIC BLOOD PRESSURE: 71 MMHG

## 2020-12-28 VITALS — SYSTOLIC BLOOD PRESSURE: 146 MMHG | DIASTOLIC BLOOD PRESSURE: 64 MMHG

## 2020-12-28 VITALS — SYSTOLIC BLOOD PRESSURE: 155 MMHG | DIASTOLIC BLOOD PRESSURE: 72 MMHG

## 2020-12-28 VITALS — SYSTOLIC BLOOD PRESSURE: 134 MMHG | DIASTOLIC BLOOD PRESSURE: 65 MMHG

## 2020-12-28 VITALS — SYSTOLIC BLOOD PRESSURE: 138 MMHG | DIASTOLIC BLOOD PRESSURE: 71 MMHG

## 2020-12-28 LAB
ANION GAP SERPL CALC-SCNC: 10 MMOL/L (ref 6–14)
BASOPHILS # BLD AUTO: 0 X10^3/UL (ref 0–0.2)
BASOPHILS NFR BLD: 0 % (ref 0–3)
BUN SERPL-MCNC: 19 MG/DL (ref 8–26)
CALCIUM SERPL-MCNC: 8.7 MG/DL (ref 8.5–10.1)
CHLORIDE SERPL-SCNC: 95 MMOL/L (ref 98–107)
CO2 SERPL-SCNC: 29 MMOL/L (ref 21–32)
CREAT SERPL-MCNC: 0.8 MG/DL (ref 0.7–1.3)
EOSINOPHIL NFR BLD: 0 % (ref 0–3)
EOSINOPHIL NFR BLD: 0 X10^3/UL (ref 0–0.7)
ERYTHROCYTE [DISTWIDTH] IN BLOOD BY AUTOMATED COUNT: 16.2 % (ref 11.5–14.5)
GFR SERPLBLD BASED ON 1.73 SQ M-ARVRAT: 91.2 ML/MIN
GLUCOSE SERPL-MCNC: 137 MG/DL (ref 70–99)
HCT VFR BLD CALC: 37.1 % (ref 39–53)
HGB BLD-MCNC: 12.2 G/DL (ref 13–17.5)
LYMPHOCYTES # BLD: 0.1 X10^3/UL (ref 1–4.8)
LYMPHOCYTES NFR BLD AUTO: 1 % (ref 24–48)
MAGNESIUM SERPL-MCNC: 2.5 MG/DL (ref 1.8–2.4)
MCH RBC QN AUTO: 29 PG (ref 25–35)
MCHC RBC AUTO-ENTMCNC: 33 G/DL (ref 31–37)
MCV RBC AUTO: 89 FL (ref 79–100)
MONO #: 0.6 X10^3/UL (ref 0–1.1)
MONOCYTES NFR BLD: 4 % (ref 0–9)
NEUT #: 12.1 X10^3/UL (ref 1.8–7.7)
NEUTROPHILS NFR BLD AUTO: 95 % (ref 31–73)
PLATELET # BLD AUTO: 199 X10^3/UL (ref 140–400)
POTASSIUM SERPL-SCNC: 3.5 MMOL/L (ref 3.5–5.1)
RBC # BLD AUTO: 4.19 X10^6/UL (ref 4.3–5.7)
SODIUM SERPL-SCNC: 134 MMOL/L (ref 136–145)
WBC # BLD AUTO: 12.8 X10^3/UL (ref 4–11)

## 2020-12-28 RX ADMIN — CARVEDILOL SCH MG: 6.25 TABLET, FILM COATED ORAL at 09:42

## 2020-12-28 RX ADMIN — ZINC SULFATE CAP 220 MG (50 MG ELEMENTAL ZN) SCH MG: 220 (50 ZN) CAP at 09:41

## 2020-12-28 RX ADMIN — OXYCODONE HYDROCHLORIDE AND ACETAMINOPHEN SCH MG: 500 TABLET ORAL at 09:42

## 2020-12-28 RX ADMIN — DEXAMETHASONE SODIUM PHOSPHATE SCH MG: 4 INJECTION, SOLUTION INTRAMUSCULAR; INTRAVENOUS at 09:43

## 2020-12-28 RX ADMIN — OXYCODONE HYDROCHLORIDE AND ACETAMINOPHEN SCH MG: 500 TABLET ORAL at 21:04

## 2020-12-28 RX ADMIN — ENOXAPARIN SODIUM SCH MG: 40 INJECTION SUBCUTANEOUS at 09:00

## 2020-12-28 RX ADMIN — ENOXAPARIN SODIUM SCH MG: 40 INJECTION SUBCUTANEOUS at 21:04

## 2020-12-28 RX ADMIN — CARVEDILOL SCH MG: 6.25 TABLET, FILM COATED ORAL at 17:13

## 2020-12-28 RX ADMIN — REMDESIVIR SCH MLS/HR: 100 INJECTION, POWDER, LYOPHILIZED, FOR SOLUTION INTRAVENOUS at 17:13

## 2020-12-28 RX ADMIN — DOXYCYCLINE SCH MLS/HR: 100 INJECTION, POWDER, LYOPHILIZED, FOR SOLUTION INTRAVENOUS at 09:43

## 2020-12-28 RX ADMIN — TIMOLOL MALEATE SCH DROP: 5 SOLUTION/ DROPS OPHTHALMIC at 09:42

## 2020-12-28 RX ADMIN — CHOLECALCIFEROL CAP 125 MCG (5000 UNIT) SCH UNIT: 125 CAP at 09:41

## 2020-12-28 RX ADMIN — BENZONATATE PRN MG: 100 CAPSULE, LIQUID FILLED ORAL at 17:19

## 2020-12-28 RX ADMIN — SIMVASTATIN SCH MG: 20 TABLET, FILM COATED ORAL at 21:04

## 2020-12-28 RX ADMIN — ASPIRIN 81 MG SCH MG: 81 TABLET ORAL at 09:41

## 2020-12-28 RX ADMIN — DOXYCYCLINE SCH MLS/HR: 100 INJECTION, POWDER, LYOPHILIZED, FOR SOLUTION INTRAVENOUS at 21:04

## 2020-12-28 NOTE — PDOC
[facility] PROGRESS NOTE


Date of Service


DOS:


DATE: 12/28/20 


TIME: 12:08





Chief Complaint


Chief Complaint


Acute  encephalopathy NOS


Covid infection


Acute hypoxic respiratory distressworsening


Hyponatremia, Hypokalemia


Myasthenia Gravis


Moderate dementia








No obvious centrally acting medications currently.  No obvious signs of 

infection on physical exam.  No fevers or nuchal rigidity.  CT head is negative 

for acute etiology.  No history or signs of trauma


Consider dementia prevention protocol


Provide adequate lighting (open curtains during the day, turn the lights off at 

night)


Provide frequent personal contact with family, friends, and staff or TV


Encourage early and frequent mobilization


Rehab screening ordered


IV Haldol as needed for agitation, consider sitter as needed if non-redirectable

agitation


Avoid physical restraints, catheters or tubes, and benzodiazepines


Nutrition consult if there is malnutrition or concern for vitamin deficiencies


Pulmonology consult for Covid PNA


Continue with math plus protocol, IV steroids, IV Remdesivir and IV steroids


Lovenox for DVT prophylaxis


ADA diet


Full code


Discussed with RN and SW


Dispo inpatient management as above.  Pending discussion with family regarding 

palliative care versus aggressive management of comorbidities.





History of Present Illness


History of Present Illness


12/28/2020


No major events overnight.  Patient is now 100% nonrebreather saturating 95%.  

Patient is DNR status.  T-max of 99.6 F overnight.  Does not appear to be more 

dyspneic.  Working with physical therapy and sitting upright on commode.  

Patient's chart, labs, images were reviewed and discussed with RN.  Chest X 

showing worsening bilateral infiltrates.  Lasix 40 mg IV given x1.





12/27/2020


No major events overnight.  Patient saturating 93% on 4 L nasal cannula.  No 

complaints voiced at this time.  He is still refusing Lovenox injections.  

Patient's chart, labs, images were reviewed and discussed with RN.  IV 

remdesivir initiated today





12/26/2020


No acute events overnight.  Patient did have a fall during the day in which he 

states that he feels fine after that.  There is no shoulder deformity and no x-

ray was taken.  Patient has no complaints voiced at this time.  Patient does 

refuse some treatments as he states that he he is not in control and that God 

makes all decisions for him at this time.  I did speak with the patient's son 

about his current management and recent Covid diagnosis.  They agree with 

resuming current care and patient is to remain on DNR/DNI status.  Patient's 

chart, labs, images were reviewed and discussed with RN





12/25/2020


No acute events overnight.  Patient saturating 95% on 4 L nasal cannula.  Fever 

of T-max of 100.6 F.  Currently on doxycycline.  Pending Covid test.  Patient 

is currently refusing medications as of now because he thinks he does not need 

any of them.  He says his home medication regimen has been fine.  Will address 

CODE STATUS with family.  Patient's chart, labs, images were reviewed and 

discussed with RN





88-year-old male past medical history significant for LE swelling on lasix 40mg 

tid, COPD (former tobacco use), HTN, HLD, gerd, glaucoma, R eye blindness (s/p 

CRAO), OA, and myasthenia gravis (on azathioprine, dx 2016),  presents to the ED

brought in by EMS from home, concern for altered mental status.  Patient lives 

with his son and daughter-in-law.  Son tested positive for Covid 11 days ago.  

Daughter-in-law is having Covid symptoms and was tested today, now awaiting 

results.  Patient was saturating 90% on room air, required 3 L nasal cannula.  

Patient is alert to self, month, location but not year. Pt with no active 

complaints in ed. Hx limited due to mental status and hearing difficulties. EMS 

reports they were out at patient's house earlier in the day for an accidental 

fall in the bathroom, unsure if pt hit his head (found back propped up next to 

tub), w/superficial skin tears to left arm. Pt is a DNR code status.





Vitals/I&O


Vitals/I&O:





                                   Vital Signs








  Date Time  Temp Pulse Resp B/P (MAP) Pulse Ox O2 Delivery O2 Flow Rate FiO2


 


12/28/20 11:18 97.5 72 18 134/65 (88) 93 NonRebreather Mask 15.0 





 97.5       














                                    I & O   


 


 12/27/20 12/27/20 12/28/20





 15:00 23:00 07:00


 


Output Total 600 ml  0 ml


 


Balance -600 ml  0 ml











Physical Exam


General:  Alert, No acute distress


Heart:  Regular rate





Assessment and Plan


Assessmemt and Plan


Problems


Medical Problems:


(1) Acute respiratory failure with hypoxia


Status: Acute  





(2) CAP (community acquired pneumonia)


Status: Acute  





(3) Person under investigation for COVID-19


Status: Acute  











Comment


Review of Relevant


I have reviewed the following items belle (where applicable) has been applied.


Medications:





Current Medications








 Medications


  (Trade)  Dose


 Ordered  Sig/Judie


 Route


 PRN Reason  Start Time


 Stop Time Status Last Admin


Dose Admin


 


 Remdesivir 200 mg/


 Sodium Chloride  210 ml @ 


 210 mls/hr  1X  ONCE


 IV


   12/27/20 16:00


 12/27/20 16:59 DC 12/27/20 16:08





 


 Benzonatate


  (Tessalon Perle)  100 mg  PRN Q6HRS  PRN


 PO


 COUGH  12/27/20 17:45


    12/27/20 17:50





 


 Furosemide


  (Lasix)  20 mg  1X  ONCE


 IVP


   12/28/20 10:30


 12/28/20 10:31 DC 12/28/20 10:54














Justifications for Admission


Other Justification


Respiratory failure with hypoxia, COVID-19 PNA











KELLIE CANDELARIA MD                    Dec 28, 2020 12:10

## 2020-12-28 NOTE — PDOC
PULMONARY PROGRESS NOTES


DATE: 12/28/20 


TIME: 10:28


Subjective


PT. has increased oxygen requirement, now on 100% NRB 


non productive cough 


Fever overnight


Vitals





Vital Signs








  Date Time  Temp Pulse Resp B/P (MAP) Pulse Ox O2 Delivery O2 Flow Rate FiO2


 


12/28/20 09:42  77  146/64    


 


12/28/20 07:14 97.8  18  90 NonRebreather Mask  





 97.8       


 


12/27/20 20:20       4.0 








Comments


Pt. seen during covid-19 pandemic visual  exam preformed 


RRR


100% NRB 


obese 


No rash or edema 


No accessory muscle use


General:  Lethargic


Labs





Laboratory Tests








Test


 12/26/20


11:30


 


White Blood Count


 12.2 x10^3/uL


(4.0-11.0)


 


Red Blood Count


 4.32 x10^6/uL


(4.30-5.70)


 


Hemoglobin


 12.7 g/dL


(13.0-17.5)


 


Hematocrit


 38.8 %


(39.0-53.0)


 


Mean Corpuscular Volume 90 fL () 


 


Mean Corpuscular Hemoglobin 30 pg (25-35) 


 


Mean Corpuscular Hemoglobin


Concent 33 g/dL


(31-37)


 


Red Cell Distribution Width


 16.2 %


(11.5-14.5)


 


Platelet Count


 168 x10^3/uL


(140-400)


 


Neutrophils (%) (Auto) 95 % (31-73) 


 


Lymphocytes (%) (Auto) 1 % (24-48) 


 


Monocytes (%) (Auto) 4 % (0-9) 


 


Eosinophils (%) (Auto) 0 % (0-3) 


 


Basophils (%) (Auto) 0 % (0-3) 


 


Neutrophils # (Auto)


 11.6 x10^3/uL


(1.8-7.7)


 


Lymphocytes # (Auto)


 0.1 x10^3/uL


(1.0-4.8)


 


Monocytes # (Auto)


 0.5 x10^3/uL


(0.0-1.1)


 


Eosinophils # (Auto)


 0.0 x10^3/uL


(0.0-0.7)


 


Basophils # (Auto)


 0.0 x10^3/uL


(0.0-0.2)


 


Sodium Level


 135 mmol/L


(136-145)


 


Potassium Level


 3.6 mmol/L


(3.5-5.1)


 


Chloride Level


 98 mmol/L


()


 


Carbon Dioxide Level


 28 mmol/L


(21-32)


 


Anion Gap 9 (6-14) 


 


Blood Urea Nitrogen


 14 mg/dL


(8-26)


 


Creatinine


 1.1 mg/dL


(0.7-1.3)


 


Estimated GFR


(Cockcroft-Gault) 63.2 





 


Glucose Level


 169 mg/dL


(70-99)


 


Calcium Level


 8.4 mg/dL


(8.5-10.1)


 


Magnesium Level


 2.5 mg/dL


(1.8-2.4)








Medications





Active Scripts








 Medications  Dose


 Route/Sig


 Max Daily Dose Days Date Category


 


 Timoptic 0.5%


  (Timolol Maleate)


 10 Ml Drops  1 Drop


 OS DAILY


  30 12/24/20 Reported


 


 Spironolactone


 100 Mg Tablet  0.5 Tab


 PO DAILY


   12/24/20 Reported


 


 Simvastatin 20 Mg


 Tablet  1 Tab


 PO QHS


   12/24/20 Reported


 


 Xalatan


  (Latanoprost) 2.5


 Ml Drops  1 Drop


 EACHEYE QHS


   12/24/20 Reported


 


 Omega 3 Fish Oil


 Softgel (Omega-3


 Fatty Acids/Fish


 Oil) 1 Each


 Capsule.dr  1 Each


 PO DAILY


   12/24/20 Reported


 


 Multi Vitamin


 Daily


  (Multivitamin) 1


 Each Tablet  1 Tab


 PO DAILY


  30 12/24/20 Reported


 


 Furosemide 40 Mg


 Tablet  3 Tab


 PO DAILY


   12/24/20 Reported


 


 Carvedilol **


  (Carvedilol) 6.25


 Mg Tablet  6.25 Mg


 PO BIDWMEALS


   12/24/20 Reported


 


 Brimonidine


 Tartrate 5 Ml


 Drops  1 Drop


 OS BID


   12/24/20 Reported


 


 Imuran


  (Azathioprine) 50


 Mg Tablet  3 Tab


 PO DAILY


  30 12/24/20 Reported


 


 Aspirin 81 Mg


 Tab.chew  1 Tab


 PO DAILY


   12/24/20 Reported


 


 Amlodipine


 Besylate 10 Mg


 Tablet  10 Mg


 PO DAILY


   12/24/20 Reported








Comments


12/28-


CXR


worsening Bilateral infiltrates





Impression


.


IMPRESSION:


1.  Acute hypoxic respiratory failure secondary to multifactorial etiologies 

including


 COVID-19 pneumonia.  He lives with his son and daughter-in-law and they


are positive.  Other differential diagnosis would include the possibility of


congestive heart failure . Less likely underlying interstitial lung disease 

related to Methotrexate.


2.  Abnormal CT chest with bilateral interstitial infiltrates and some ground


glass infiltrates.  This is nonspecific, but in the setting of COVID-19


infection, this could be all related to COVID.  


3.  No evidence of pulmonary embolism.


4.  Questionable chronic obstructive pulmonary disease.


5.  Morbid obesity, suspected obstructive sleep apnea and obesity


hypoventilation syndrome.


6.  COVID-19 positive





Plan


.





Continue supplemental oxygen to keep sats above 92%, now on 100% NRB 


Follow CXR PRn, reviewed today worsening Bilateral infiltrates-- lasix X1 / 

suspect all due to COVID 


Continue Dexamethasone


Continue full course of remdesivir 


Continue Doxycycline.


May benefit from a followup CT chest in 6-8 weeks.


The patient is currently on methotrexate.  Possibility of methotrexate-induced 

interstitial lung disease less likely


PT/OT


DVT/GI PPX 








D/W RN and RT 


 





PT. is DNR











GIOVANNI ADEN MD                 Dec 28, 2020 10:32

## 2020-12-28 NOTE — NUR
SW following for discharge planning. Spoke with RN and reviewed chart. Pt COVID positive. Pt 
on 15l non rebreather. Pt on IV Remdesivier. Possible hospice referral on 12/29 per Dr. Arnett. 
SW following.

## 2020-12-28 NOTE — NUR
Wound/Ostomy Care



Wound Type/Assessment:  

Pt seen per wound care consult. See wound assessment. Pt has large skin tear to the left 
upper arm. Wound cleansed, assessed, measured, and pictured. 





Treatment Recommendations/Plan:  

Recommendations for Xeroform gauze, ABD pad, and kerlix. Change every other day. Dressing 
applied. 





Education provided: 

Pt educated on dressing changes and pressure relief. 





Offloading surface/device: 

N/A



Recommended Referrals/Tests: 

N/A 



Discharge Recommendations for dressings: 

Dressing instructions left in room. No other wounds noted. Pt in chair at this time and call 
light in reach. Wound care will follow up for reassessment on 01/06/20.

## 2020-12-28 NOTE — RAD
XR CHEST 1V



History: Reason: pna / Spl. Instructions:  / History: 



Comparison: December 24, 2020



Findings:

Increased bilateral mid and basilar consolidations. No pleural effusion. No pneumothorax. Cardiomegal
y, unchanged. 



Impression: 

1.  Increased mid and bibasilar consolidations, may represent pneumonia or pulmonary edema.

2.  Cardiomegaly, unchanged.



Electronically signed by: Jeffry Rivero DO (12/28/2020 11:39 AM) [id number]

## 2020-12-29 VITALS — DIASTOLIC BLOOD PRESSURE: 64 MMHG | SYSTOLIC BLOOD PRESSURE: 130 MMHG

## 2020-12-29 VITALS — SYSTOLIC BLOOD PRESSURE: 157 MMHG | DIASTOLIC BLOOD PRESSURE: 73 MMHG

## 2020-12-29 VITALS — SYSTOLIC BLOOD PRESSURE: 153 MMHG | DIASTOLIC BLOOD PRESSURE: 72 MMHG

## 2020-12-29 VITALS — SYSTOLIC BLOOD PRESSURE: 162 MMHG | DIASTOLIC BLOOD PRESSURE: 71 MMHG

## 2020-12-29 VITALS — DIASTOLIC BLOOD PRESSURE: 62 MMHG | SYSTOLIC BLOOD PRESSURE: 146 MMHG

## 2020-12-29 RX ADMIN — DOXYCYCLINE SCH MLS/HR: 100 INJECTION, POWDER, LYOPHILIZED, FOR SOLUTION INTRAVENOUS at 09:01

## 2020-12-29 RX ADMIN — ZINC SULFATE CAP 220 MG (50 MG ELEMENTAL ZN) SCH MG: 220 (50 ZN) CAP at 08:57

## 2020-12-29 RX ADMIN — CHOLECALCIFEROL CAP 125 MCG (5000 UNIT) SCH UNIT: 125 CAP at 08:57

## 2020-12-29 RX ADMIN — CARVEDILOL SCH MG: 6.25 TABLET, FILM COATED ORAL at 08:58

## 2020-12-29 RX ADMIN — OXYCODONE HYDROCHLORIDE AND ACETAMINOPHEN SCH MG: 500 TABLET ORAL at 08:58

## 2020-12-29 RX ADMIN — BENZONATATE PRN MG: 100 CAPSULE, LIQUID FILLED ORAL at 08:58

## 2020-12-29 RX ADMIN — ASPIRIN 81 MG SCH MG: 81 TABLET ORAL at 08:57

## 2020-12-29 RX ADMIN — DOXYCYCLINE SCH MLS/HR: 100 INJECTION, POWDER, LYOPHILIZED, FOR SOLUTION INTRAVENOUS at 20:08

## 2020-12-29 RX ADMIN — CARVEDILOL SCH MG: 6.25 TABLET, FILM COATED ORAL at 17:05

## 2020-12-29 RX ADMIN — REMDESIVIR SCH MLS/HR: 100 INJECTION, POWDER, LYOPHILIZED, FOR SOLUTION INTRAVENOUS at 16:00

## 2020-12-29 RX ADMIN — TIMOLOL MALEATE SCH DROP: 5 SOLUTION/ DROPS OPHTHALMIC at 09:00

## 2020-12-29 RX ADMIN — ENOXAPARIN SODIUM SCH MG: 40 INJECTION SUBCUTANEOUS at 09:00

## 2020-12-29 RX ADMIN — DEXAMETHASONE SODIUM PHOSPHATE SCH MG: 4 INJECTION, SOLUTION INTRAMUSCULAR; INTRAVENOUS at 08:59

## 2020-12-29 NOTE — NUR
Pt was offered a bed bath but is refusing at this time. He stated, "not today". Pt was 
encouraged but stated he "doesn't feel like it" and "I want to rest". Will continue to 
encourage.

## 2020-12-29 NOTE — PDOC
[facility] [facility] PROGRESS NOTE


Date of Service


DOS:


DATE: 12/29/20 


TIME: 12:18





Chief Complaint


Chief Complaint


Acute  encephalopathy NOS


Covid infection


Acute hypoxic respiratory distressworsening


Hyponatremia, Hypokalemia


Myasthenia Gravis


Moderate dementia








Consider dementia prevention protocol


Provide adequate lighting (open curtains during the day, turn the lights off at 

night)


Provide frequent personal contact with family, friends, and staff or TV


Encourage early and frequent mobilization


Rehab screening ordered


IV Haldol as needed for agitation, consider sitter as needed if non-redirectable

agitation


Avoid physical restraints, catheters or tubes, and benzodiazepines


Nutrition consult if there is malnutrition or concern for vitamin deficiencies


Pulmonology consult for Covid PNA


Continue with math plus protocol, IV steroids, IV Remdesivir and IV steroids


Lovenox for DVT prophylaxis


Hospice consult


ADA diet


Full code


Discussed with RN and SW


Dispo inpatient management as above.  Pending discussion with family regarding 

palliative care versus aggressive management of comorbidities.





History of Present Illness


History of Present Illness


12/20/2020


No major events overnight.  Patient seen and examined bedside.  Patient's 

clinical status is worsening.  Patient's saturations 93% on 15 L nonrebreather. 

Will consult hospice and evaluate.  Patient's chart, labs, images were reviewed 

and discussed with RN





12/28/2020


No major events overnight.  Patient is now 100% nonrebreather saturating 95%.  

Patient is DNR status.  T-max of 99.6 F overnight.  Does not appear to be more 

dyspneic.  Working with physical therapy and sitting upright on commode.  

Patient's chart, labs, images were reviewed and discussed with RN.  Chest X 

showing worsening bilateral infiltrates.  Lasix 40 mg IV given x1.





12/27/2020


No major events overnight.  Patient saturating 93% on 4 L nasal cannula.  No 

complaints voiced at this time.  He is still refusing Lovenox injections.  

Patient's chart, labs, images were reviewed and discussed with RN.  IV 

remdesivir initiated today





12/26/2020


No acute events overnight.  Patient did have a fall during the day in which he 

states that he feels fine after that.  There is no shoulder deformity and no x-

ray was taken.  Patient has no complaints voiced at this time.  Patient does re

fuse some treatments as he states that he he is not in control and that God 

makes all decisions for him at this time.  I did speak with the patient's son 

about his current management and recent Covid diagnosis.  They agree with 

resuming current care and patient is to remain on DNR/DNI status.  Patient's 

chart, labs, images were reviewed and discussed with RN





12/25/2020


No acute events overnight.  Patient saturating 95% on 4 L nasal cannula.  Fever 

of T-max of 100.6 F.  Currently on doxycycline.  Pending Covid test.  Patient 

is currently refusing medications as of now because he thinks he does not need 

any of them.  He says his home medication regimen has been fine.  Will address 

CODE STATUS with family.  Patient's chart, labs, images were reviewed and disc

ussed with RN





88-year-old male past medical history significant for LE swelling on lasix 40mg 

tid, COPD (former tobacco use), HTN, HLD, gerd, glaucoma, R eye blindness (s/p 

CRAO), OA, and myasthenia gravis (on azathioprine, dx 2016),  presents to the ED

brought in by EMS from home, concern for altered mental status.  Patient lives 

with his son and daughter-in-law.  Son tested positive for Covid 11 days ago.  

Daughter-in-law is having Covid symptoms and was tested today, now awaiting 

results.  Patient was saturating 90% on room air, required 3 L nasal cannula.  

Patient is alert to self, month, location but not year. Pt with no active 

complaints in ed. Hx limited due to mental status and hearing difficulties. EMS 

reports they were out at patient's house earlier in the day for an accidental 

fall in the bathroom, unsure if pt hit his head (found back propped up next to 

tub), w/superficial skin tears to left arm. Pt is a DNR code status.





Vitals/I&O


Vitals/I&O:





                                   Vital Signs








  Date Time  Temp Pulse Resp B/P (MAP) Pulse Ox O2 Delivery O2 Flow Rate FiO2


 


12/29/20 11:16 97.7 75 28 157/73 (101) 95 NonRebreather Mask 15.0 





 97.7       














                                    I & O   


 


 12/28/20 12/28/20 12/29/20





 14:59 22:59 06:59


 


Intake Total 240 ml 100 ml 100 ml


 


Output Total 350 ml  


 


Balance -110 ml 100 ml 100 ml











Physical Exam


General:  Alert, No acute distress


Heart:  Regular rate





Labs


Labs:





Laboratory Tests








Test


 12/28/20


14:35 12/28/20


15:00


 


White Blood Count


 12.8 x10^3/uL


(4.0-11.0) 





 


Red Blood Count


 4.19 x10^6/uL


(4.30-5.70) 





 


Hemoglobin


 12.2 g/dL


(13.0-17.5) 





 


Hematocrit


 37.1 %


(39.0-53.0) 





 


Mean Corpuscular Volume 89 fL ()  


 


Mean Corpuscular Hemoglobin 29 pg (25-35)  


 


Mean Corpuscular Hemoglobin


Concent 33 g/dL


(31-37) 





 


Red Cell Distribution Width


 16.2 %


(11.5-14.5) 





 


Platelet Count


 199 x10^3/uL


(140-400) 





 


Neutrophils (%) (Auto) 95 % (31-73)  


 


Lymphocytes (%) (Auto) 1 % (24-48)  


 


Monocytes (%) (Auto) 4 % (0-9)  


 


Eosinophils (%) (Auto) 0 % (0-3)  


 


Basophils (%) (Auto) 0 % (0-3)  


 


Neutrophils # (Auto)


 12.1 x10^3/uL


(1.8-7.7) 





 


Lymphocytes # (Auto)


 0.1 x10^3/uL


(1.0-4.8) 





 


Monocytes # (Auto)


 0.6 x10^3/uL


(0.0-1.1) 





 


Eosinophils # (Auto)


 0.0 x10^3/uL


(0.0-0.7) 





 


Basophils # (Auto)


 0.0 x10^3/uL


(0.0-0.2) 





 


Sodium Level


 


 134 mmol/L


(136-145)


 


Potassium Level


 


 3.5 mmol/L


(3.5-5.1)


 


Chloride Level


 


 95 mmol/L


()


 


Carbon Dioxide Level


 


 29 mmol/L


(21-32)


 


Anion Gap  10 (6-14) 


 


Blood Urea Nitrogen


 


 19 mg/dL


(8-26)


 


Creatinine


 


 0.8 mg/dL


(0.7-1.3)


 


Estimated GFR


(Cockcroft-Gault) 


 91.2 





 


Glucose Level


 


 137 mg/dL


(70-99)


 


Calcium Level


 


 8.7 mg/dL


(8.5-10.1)


 


Magnesium Level


 


 2.5 mg/dL


(1.8-2.4)











Assessment and Plan


Assessmemt and Plan


Problems


Medical Problems:


(1) Acute respiratory failure with hypoxia


Status: Acute  





(2) CAP (community acquired pneumonia)


Status: Acute  





(3) Person under investigation for COVID-19


Status: Acute  











Comment


Review of Relevant


I have reviewed the following items belle (where applicable) has been applied.


Medications:





Current Medications








 Medications


  (Trade)  Dose


 Ordered  Sig/Judie


 Route


 PRN Reason  Start Time


 Stop Time Status Last Admin


Dose Admin


 


 Remdesivir 100 mg/


 Sodium Chloride  230 ml @ 


 460 mls/hr  Q24H


 IV


   12/28/20 16:00


 12/31/20 16:29  12/28/20 17:13














Justifications for Admission


Other Justification


Respiratory failure with hypoxia, COVID-19 PNA











KELLIE CANDELARIA MD                    Dec 29, 2020 12:19

## 2020-12-29 NOTE — NUR
SW following for discharge planning. Spoke with RN and reviewed chart. Spoke with Dr. Arnett who 
would like pt to be evaluated for hospice if family is agreeable. Spoke with son who is POA 
for HC and he and his sister are agreeable. SW phoned and faxed referral to [facility] 
for GIP evaluation as family stated no preference in provider. Patient choice of vendor form 
completed. SW available as needed.

-------------------------------------------------------------------------------

Addendum: 20 at 0914 by HARESH SIMON SW

-------------------------------------------------------------------------------

pt . No additional SW needs.

## 2020-12-29 NOTE — NUR
Dr. Yun on floor after seeing pt. Per Dr. Yun, it is ok to not have IV access as he 
has poor venous access and pt does not want another IV. He also stated to stop the 
remdesivir. Dr. Yun will change pt medications to PO.

## 2020-12-29 NOTE — PDOC
PULMONARY PROGRESS NOTES


DATE: 12/29/20 


TIME: 15:20


Subjective


Patient upset, has poor IV access.  Is not more short of air.


Vitals





Vital Signs








  Date Time  Temp Pulse Resp B/P (MAP) Pulse Ox O2 Delivery O2 Flow Rate FiO2


 


12/29/20 11:16 97.7 75 28 157/73 (101) 95 NonRebreather Mask 15.0 





 97.7       








Comments


Pt. seen during covid-19 pandemic visual  exam preformed 


RRR


100% NRB 


obese 


No rash or edema 


No accessory muscle use


Labs





Laboratory Tests








Test


 12/28/20


14:35 12/28/20


15:00


 


White Blood Count


 12.8 x10^3/uL


(4.0-11.0) 





 


Red Blood Count


 4.19 x10^6/uL


(4.30-5.70) 





 


Hemoglobin


 12.2 g/dL


(13.0-17.5) 





 


Hematocrit


 37.1 %


(39.0-53.0) 





 


Mean Corpuscular Volume 89 fL ()  


 


Mean Corpuscular Hemoglobin 29 pg (25-35)  


 


Mean Corpuscular Hemoglobin


Concent 33 g/dL


(31-37) 





 


Red Cell Distribution Width


 16.2 %


(11.5-14.5) 





 


Platelet Count


 199 x10^3/uL


(140-400) 





 


Neutrophils (%) (Auto) 95 % (31-73)  


 


Lymphocytes (%) (Auto) 1 % (24-48)  


 


Monocytes (%) (Auto) 4 % (0-9)  


 


Eosinophils (%) (Auto) 0 % (0-3)  


 


Basophils (%) (Auto) 0 % (0-3)  


 


Neutrophils # (Auto)


 12.1 x10^3/uL


(1.8-7.7) 





 


Lymphocytes # (Auto)


 0.1 x10^3/uL


(1.0-4.8) 





 


Monocytes # (Auto)


 0.6 x10^3/uL


(0.0-1.1) 





 


Eosinophils # (Auto)


 0.0 x10^3/uL


(0.0-0.7) 





 


Basophils # (Auto)


 0.0 x10^3/uL


(0.0-0.2) 





 


Sodium Level


 


 134 mmol/L


(136-145)


 


Potassium Level


 


 3.5 mmol/L


(3.5-5.1)


 


Chloride Level


 


 95 mmol/L


()


 


Carbon Dioxide Level


 


 29 mmol/L


(21-32)


 


Anion Gap  10 (6-14) 


 


Blood Urea Nitrogen


 


 19 mg/dL


(8-26)


 


Creatinine


 


 0.8 mg/dL


(0.7-1.3)


 


Estimated GFR


(Cockcroft-Gault) 


 91.2 





 


Glucose Level


 


 137 mg/dL


(70-99)


 


Calcium Level


 


 8.7 mg/dL


(8.5-10.1)


 


Magnesium Level


 


 2.5 mg/dL


(1.8-2.4)








Medications





Active Scripts








 Medications  Dose


 Route/Sig


 Max Daily Dose Days Date Category


 


 Timoptic 0.5%


  (Timolol Maleate)


 10 Ml Drops  1 Drop


 OS DAILY


  30 12/24/20 Reported


 


 Spironolactone


 100 Mg Tablet  0.5 Tab


 PO DAILY


   12/24/20 Reported


 


 Simvastatin 20 Mg


 Tablet  1 Tab


 PO QHS


   12/24/20 Reported


 


 Xalatan


  (Latanoprost) 2.5


 Ml Drops  1 Drop


 EACHEYE QHS


   12/24/20 Reported


 


 Omega 3 Fish Oil


 Softgel (Omega-3


 Fatty Acids/Fish


 Oil) 1 Each


 Capsule.dr  1 Each


 PO DAILY


   12/24/20 Reported


 


 Multi Vitamin


 Daily


  (Multivitamin) 1


 Each Tablet  1 Tab


 PO DAILY


  30 12/24/20 Reported


 


 Furosemide 40 Mg


 Tablet  3 Tab


 PO DAILY


   12/24/20 Reported


 


 Carvedilol **


  (Carvedilol) 6.25


 Mg Tablet  6.25 Mg


 PO BIDWMEALS


   12/24/20 Reported


 


 Brimonidine


 Tartrate 5 Ml


 Drops  1 Drop


 OS BID


   12/24/20 Reported


 


 Imuran


  (Azathioprine) 50


 Mg Tablet  3 Tab


 PO DAILY


  30 12/24/20 Reported


 


 Aspirin 81 Mg


 Tab.chew  1 Tab


 PO DAILY


   12/24/20 Reported


 


 Amlodipine


 Besylate 10 Mg


 Tablet  10 Mg


 PO DAILY


   12/24/20 Reported








Comments


12/28-


CXR


worsening Bilateral infiltrates





Impression


.


IMPRESSION:


1.  Acute hypoxic respiratory failure secondary to multifactorial etiologies 

including


 COVID-19 pneumonia.  He lives with his son and daughter-in-law and they


are positive.  Other differential diagnosis would include the possibility of


congestive heart failure . Less likely underlying interstitial lung disease rela

kuldeep to Methotrexate.


2.  Abnormal CT chest with bilateral interstitial infiltrates and some ground


glass infiltrates.  This is nonspecific, but in the setting of COVID-19


infection, this could be all related to COVID.  


3.  No evidence of pulmonary embolism.


4.  Questionable chronic obstructive pulmonary disease.


5.  Morbid obesity, suspected obstructive sleep apnea and obesity


hypoventilation syndrome.


6.  COVID-19 positive





Plan


.


Patient feels slightly better, decrease oxygen as tolerated


Switch to oral medications.


Discussed with RN family discussion on possible hospice.


Follow CXR PRn, reviewed today worsening Bilateral infiltrates-- lasix X1 / 

suspect all due to COVID 


Continue Dexamethasone


Continue full course of remdesivir, patient with no IV site.  We will proceed 

with discontinuing remdesivir,


Continue Doxycycline.


The patient is currently on methotrexate.  Possibility of methotrexate-induced 

interstitial lung disease less likely


PT/OT


DVT/GI PPX 








D/W RN and RT 


 





PT. is DNR











JOSE PRYOR MD              Dec 29, 2020 15:23

## 2020-12-30 NOTE — NUR
Pt assessed during the noc approximately . Pt sitting in recliner with legs down. Pt 
states "so glad to see you guys and happy you are taking care of me tonight". Thanked pt and 
informed of deep breathing and relaxing tonight. Pt verbalized understanding. Pt O2 
saturation above 95%. Pt currently on nonrebreather 15L and nasal cannula 5L to keep 
saturations above 90%. However after leaving room and attempting to get medications ready. 
Informed from Charge Nurse of pt rhythm currently Vfib/Vtach on monitor. Entered room 
immediately and noted pt sitting in recliner however not responding. Pt currently no HR 
noted after listening however pt still Vfib on monitor. Continued to stay with pt until 
Asystole noted. Pt transferred back to bed with helena lift and assist x3. Pt cleansed and 
linens changed for possible visitors. Pt pronounced by 2 Nurses 20 at . Called son 
Claude however no one able to come see pt since family 85 miles away. Attempted to get 
 information however son states they do not know at this time. Son called back later 
and states still do not know however will notify someone in am. Informed to call Security or 
if unable able to call this floor and will get information to the right person. Son 
verbalized understanding and appreciated staff for the care of his father. No more questions 
or concerns at this time. Informed to call with questions.